# Patient Record
Sex: FEMALE | Race: BLACK OR AFRICAN AMERICAN | NOT HISPANIC OR LATINO | URBAN - METROPOLITAN AREA
[De-identification: names, ages, dates, MRNs, and addresses within clinical notes are randomized per-mention and may not be internally consistent; named-entity substitution may affect disease eponyms.]

---

## 2020-08-17 ENCOUNTER — OUT OF OFFICE VISIT (OUTPATIENT)
Dept: URBAN - METROPOLITAN AREA MEDICAL CENTER 16 | Facility: MEDICAL CENTER | Age: 60
End: 2020-08-17
Payer: SELF-PAY

## 2020-08-17 DIAGNOSIS — N17.8 OTHER ACUTE KIDNEY FAILURE: ICD-10-CM

## 2020-08-17 DIAGNOSIS — K50.118 CROHN'S COLITIS, OTHER COMPLICATION: ICD-10-CM

## 2020-08-17 DIAGNOSIS — Z91.19 NONCOMPLIANCE: ICD-10-CM

## 2020-08-17 DIAGNOSIS — R19.7 ACUTE DIARRHEA: ICD-10-CM

## 2020-08-17 PROCEDURE — 99232 SBSQ HOSP IP/OBS MODERATE 35: CPT | Performed by: INTERNAL MEDICINE

## 2020-08-17 PROCEDURE — 99254 IP/OBS CNSLTJ NEW/EST MOD 60: CPT | Performed by: INTERNAL MEDICINE

## 2021-01-25 ENCOUNTER — TELEPHONE ENCOUNTER (OUTPATIENT)
Dept: URBAN - METROPOLITAN AREA SURGERY CENTER 30 | Facility: SURGERY CENTER | Age: 61
End: 2021-01-25

## 2021-01-29 ENCOUNTER — TELEPHONE ENCOUNTER (OUTPATIENT)
Dept: URBAN - METROPOLITAN AREA CLINIC 118 | Facility: CLINIC | Age: 61
End: 2021-01-29

## 2021-01-29 ENCOUNTER — TELEPHONE ENCOUNTER (OUTPATIENT)
Dept: URBAN - METROPOLITAN AREA CLINIC 98 | Facility: CLINIC | Age: 61
End: 2021-01-29

## 2021-01-30 ENCOUNTER — TELEPHONE ENCOUNTER (OUTPATIENT)
Dept: URBAN - METROPOLITAN AREA CLINIC 98 | Facility: CLINIC | Age: 61
End: 2021-01-30

## 2021-02-05 ENCOUNTER — TELEPHONE ENCOUNTER (OUTPATIENT)
Dept: URBAN - METROPOLITAN AREA CLINIC 98 | Facility: CLINIC | Age: 61
End: 2021-02-05

## 2021-02-12 ENCOUNTER — TELEPHONE ENCOUNTER (OUTPATIENT)
Dept: URBAN - METROPOLITAN AREA CLINIC 98 | Facility: CLINIC | Age: 61
End: 2021-02-12

## 2021-02-22 ENCOUNTER — OUT OF OFFICE VISIT (OUTPATIENT)
Dept: URBAN - METROPOLITAN AREA MEDICAL CENTER 39 | Facility: MEDICAL CENTER | Age: 61
End: 2021-02-22
Payer: SELF-PAY

## 2021-02-22 DIAGNOSIS — R93.3 ABN FINDINGS-GI TRACT: ICD-10-CM

## 2021-02-22 DIAGNOSIS — K50.818 CROHN'S DISEASE OF BOTH SMALL AND LARGE INTESTINE WITH OTHER COMPLICATION: ICD-10-CM

## 2021-02-22 DIAGNOSIS — R19.7 ACUTE DIARRHEA: ICD-10-CM

## 2021-02-22 DIAGNOSIS — R10.84 ABDOMINAL CRAMPING, GENERALIZED: ICD-10-CM

## 2021-02-22 PROCEDURE — 99232 SBSQ HOSP IP/OBS MODERATE 35: CPT | Performed by: INTERNAL MEDICINE

## 2021-02-22 PROCEDURE — 99254 IP/OBS CNSLTJ NEW/EST MOD 60: CPT | Performed by: INTERNAL MEDICINE

## 2021-02-27 ENCOUNTER — OUT OF OFFICE VISIT (OUTPATIENT)
Dept: URBAN - METROPOLITAN AREA MEDICAL CENTER 39 | Facility: MEDICAL CENTER | Age: 61
End: 2021-02-27
Payer: SELF-PAY

## 2021-02-27 DIAGNOSIS — K92.2 ACUTE GASTROINTESTINAL BLEEDING: ICD-10-CM

## 2021-02-27 DIAGNOSIS — K52.89 (LYMPHOCYTIC) MICROSCOPIC COLITIS: ICD-10-CM

## 2021-02-27 DIAGNOSIS — K86.89 ATROPHIC PANCREAS: ICD-10-CM

## 2021-02-27 DIAGNOSIS — K29.60 ADENOPAPILLOMATOSIS GASTRICA: ICD-10-CM

## 2021-02-27 DIAGNOSIS — K50.80 CROHN'S COLITIS: ICD-10-CM

## 2021-02-27 DIAGNOSIS — K22.2 ACQUIRED ESOPHAGEAL RING: ICD-10-CM

## 2021-02-27 DIAGNOSIS — K62.5 ANAL BLEEDING: ICD-10-CM

## 2021-02-27 DIAGNOSIS — K20.80 ESOPHAGITIS, LOS ANGELES GRADE B: ICD-10-CM

## 2021-02-27 DIAGNOSIS — K63.5 BENIGN COLON POLYP: ICD-10-CM

## 2021-02-27 DIAGNOSIS — D12.3 ADENOMA OF TRANSVERSE COLON: ICD-10-CM

## 2021-02-27 PROCEDURE — 45380 COLONOSCOPY AND BIOPSY: CPT | Performed by: INTERNAL MEDICINE

## 2021-02-27 PROCEDURE — 43239 EGD BIOPSY SINGLE/MULTIPLE: CPT | Performed by: INTERNAL MEDICINE

## 2021-02-27 PROCEDURE — 45385 COLONOSCOPY W/LESION REMOVAL: CPT | Performed by: INTERNAL MEDICINE

## 2021-02-27 PROCEDURE — 99233 SBSQ HOSP IP/OBS HIGH 50: CPT | Performed by: INTERNAL MEDICINE

## 2021-02-27 PROCEDURE — 43249 ESOPH EGD DILATION <30 MM: CPT | Performed by: INTERNAL MEDICINE

## 2021-02-27 PROCEDURE — 99232 SBSQ HOSP IP/OBS MODERATE 35: CPT | Performed by: INTERNAL MEDICINE

## 2021-03-04 ENCOUNTER — OFFICE VISIT (OUTPATIENT)
Dept: URBAN - METROPOLITAN AREA CLINIC 118 | Facility: CLINIC | Age: 61
End: 2021-03-04

## 2021-03-04 RX ORDER — ONDANSETRON HYDROCHLORIDE 4 MG/1
TAKE 2 TABLETS (8 MG) BY ORAL ROUTE DAILY TABLET, FILM COATED ORAL
Qty: 60 | Refills: 3 | COMMUNITY
Start: 1900-01-01

## 2021-03-04 RX ORDER — PREDNISONE 5 MG/1
TAKE 1 TABLET (5 MG) BY ORAL ROUTE 3 TIMES PER DAY TABLET ORAL
Qty: 45 | Refills: 0 | COMMUNITY
Start: 1900-01-01

## 2021-03-04 RX ORDER — ESCITALOPRAM OXALATE 5 MG
TAKE 1 TABLET (5 MG) BY ORAL ROUTE ONCE DAILY TABLET ORAL 1
Qty: 0 | Refills: 0 | COMMUNITY
Start: 1900-01-01

## 2021-03-04 RX ORDER — LISINOPRIL 5 MG/1
TAKE 1 TABLET (5 MG) BY ORAL ROUTE ONCE DAILY TABLET ORAL 1
Qty: 0 | Refills: 0 | COMMUNITY
Start: 1900-01-01

## 2021-03-22 ENCOUNTER — TELEPHONE ENCOUNTER (OUTPATIENT)
Dept: URBAN - METROPOLITAN AREA CLINIC 98 | Facility: CLINIC | Age: 61
End: 2021-03-22

## 2021-03-22 RX ORDER — PREDNISONE 20 MG/1
1 TABLET TABLET ORAL ONCE A DAY
Qty: 90 TABLET | Refills: 3 | OUTPATIENT
Start: 2021-03-22 | End: 2022-03-17

## 2021-06-01 ENCOUNTER — OFFICE VISIT (OUTPATIENT)
Dept: URBAN - METROPOLITAN AREA CLINIC 98 | Facility: CLINIC | Age: 61
End: 2021-06-01
Payer: SELF-PAY

## 2021-06-01 DIAGNOSIS — K50.90 CROHN'S DISEASE: ICD-10-CM

## 2021-06-01 PROCEDURE — 99213 OFFICE O/P EST LOW 20 MIN: CPT | Performed by: INTERNAL MEDICINE

## 2021-06-01 RX ORDER — PREDNISONE 20 MG/1
1 TABLET TABLET ORAL ONCE A DAY
Qty: 90 TABLET | Refills: 3 | Status: ACTIVE | COMMUNITY
Start: 2021-03-22 | End: 2022-03-17

## 2021-06-01 RX ORDER — PREDNISONE 5 MG/1
TAKE 1 TABLET (5 MG) BY ORAL ROUTE 3 TIMES PER DAY TABLET ORAL
Qty: 45 | Refills: 0 | Status: ACTIVE | COMMUNITY
Start: 1900-01-01

## 2021-06-01 RX ORDER — ESCITALOPRAM OXALATE 5 MG
TAKE 1 TABLET (5 MG) BY ORAL ROUTE ONCE DAILY TABLET ORAL 1
Qty: 0 | Refills: 0 | Status: ACTIVE | COMMUNITY
Start: 1900-01-01

## 2021-06-01 RX ORDER — ONDANSETRON HYDROCHLORIDE 4 MG/1
TAKE 2 TABLETS (8 MG) BY ORAL ROUTE DAILY TABLET, FILM COATED ORAL
Qty: 60 | Refills: 3 | Status: ACTIVE | COMMUNITY
Start: 1900-01-01

## 2021-06-01 RX ORDER — LISINOPRIL 5 MG/1
TAKE 1 TABLET (5 MG) BY ORAL ROUTE ONCE DAILY TABLET ORAL 1
Qty: 0 | Refills: 0 | Status: ACTIVE | COMMUNITY
Start: 1900-01-01

## 2021-06-01 NOTE — HPI-TODAY'S VISIT:
She has gotten Humira and a nurse was going to help her get the injection.  Jennifer sent alot of pens  Refrigerated. TB test negative.  Current syx: Diarrhea, not that much.  Want to know about the safety. Lots of side effects.  Currently on 15 to 20 mg prednisone Humira as a single agent is safe  Takes 3 BP meds ---- in part due to prednisone  Had to break away and   Humira is better for her than prednison  Has insurance.  [place]. =================== 5/23/19  Follow-up Crohn's Disease    History Of Present Illness  This is a scheduled appointment for this patient, a 58 year old /Black female, after a previous visit approximately Crohn's disease.     59 yo female with moderate to severe Crohn's disease comes in for 1 year f/u.  Currently on 10 mg pred a day.  In past year, has been up to 20 mg a day.  Offered to get Humira for her but needed pre-tx labs and she did not get them done.  Advised going to the health department.  AGWS.  Typically has 1-2 stools a day. No blood in stool. Sometime has abdominal pain.   ============= 56 yo female with Crohn's disease dx 1998, CD visits with me since 2004 or earlier.  comes in with daughter Júnior Lopez  Past poor response to 6 MP and has used chronic prednisone.  Discussed Humira.  Now we think Stelara might be the best shot.  Currently on prednisone 15 mg a day.  On HCTz'ahd  1 other.  BP ok controll.  Not diabetic.  No other medical problems.  Chronic back problems, lifting daughter.  Works at [employer] $8.75/hr      Past Medical History  Disease Name Date Onset Notes  Crohn's disease unk --   Depression unk --   Hypertension unk --       Past Surgical History  Procedure Name Date Notes  *No Past Surgical History unk 05/23/2019 - 03/09/2018 -       Medication List  Name Date Started Instructions  Lexapro 5 mg oral tablet  take 1 tablet (5 mg) by oral route once daily  lisinopril 5 mg oral tablet  take 1 tablet (5 mg) by oral route once daily  multivitamin oral  --   prednisone 5 mg oral tablet 02/17/2019 TAKE 3 TABLETS BY MOUTH ONCE DAILY  Zofran 4 mg oral tablet  take 2 tablets (8 mg) by oral route daily      Allergy List  Allergen Name Date Reaction Notes  NO KNOWN DRUG ALLERGIES --  --  --   No Known Environmental Allergies --  --  --   No Known Food Allergies --  --  --       Family Medical History  Disease Name Relative/Age Notes  *No Stated Family Medical History Father/ Mother/  no known history      Social History  Finding Status Start/Stop Quantity Notes  Alcohol Never --/-- --  05/23/2019 - 03/09/2018 -   Denies illicit substance abuse --  --/-- --  05/23/2019 -   Denies substance abuse --  --/-- --  05/23/2019 -   Tobacco Former --/-- --  05/23/2019 - 03/09/2018 - 11/03/2016 - 09/04/2015 -       Review of Systems  ConstitutionalDenies : body aches, chills, fatigue, fever, loss of appetite, malaise, night sweats, weight gain, weight loss  EyesDenies : blurred vision, visual changes  HENTDenies : Ear Pain/Ringing, hearing loss, Mouth Ulcers/Sores, nose bleeds, problems with gums/teeth, trouble swallowing  CardiovascularDenies : chest pain, leaky heart valves, heart murmur, heart racing/skipping, high blood pressure, palpitations  RespiratoryDenies : chronic cough, shortness of breath, wheezing or asthma symptoms  GastrointestinalDenies : Abdominal Pain/discomfort, Anal/Rectal Pain or Itching, Anal Spasm, Black Stool, Bloating/belching/gaseouness, Change of Bowel Habit, Constipation, Diarrhea/Loose Stool, Difficulty in Swallowing, Heartburn/esophageal reflux, Hemorrhoids, Indigestion, Mucus in Stool, Nausea/vomiting, Rectal Bleeding, Unintentional Weight Loss  GenitourinaryDenies : Blood in Urine, Burning/pain with Urination, frequency, Recent/frequent UTI, Kidney Stones  IntegumentDenies : itching/dry skin, jaundice, rashes, bumps or sores  NeurologicDenies : headaches, dizziness/vertigo, head trauma/injury, recent numbness/weakness, seizures  MusculoskeletalDenies : back pain, decreased range of motion, joint pain/arthritis, Problems Walking/calf or leg pain  EndocrineDenies : bruise easily, excessive thirst, heat/cold intolerance, history of high or low blood sugar  PsychiatricDenies : anxiety, changes in sleep pattern, depression, loss of memory  Heme-LymphDenies : Bleeding Problems, Enlarged Nodes/Swolen Glands, excessive bruising, history of anemia  Allergic-ImmunologicDenies : seasonal allergies    Vitals  Date Time BP Position Site L\R Cuff Size HR RR TEMP (F) WT  HT  BMI kg/m2 BSA m2 O2 Sat HC     05/23/2019 02:25 /104 Sitting    88 - R  97.9 135lbs 0oz 5'  3" 23.91 1.65        Physical Examination  ConstitutionalAppearance : Well nourished, well developed patient in no distress. Ambulating without difficulty.  Ability to Communicate : Normal communication ability  EyesConjunctivae and Eyelids : Conjunctivae and eyelids appear normal  Sclerae : White without injection  HENTHead :  Inspection : Normocephalic and atraumatic  Face :  Inspection : Face within normal limits  Ears :  External Ears : External ears within normal limits  Nose/Nasopharynx :  External Nose : External nose normal appearance, nares patent, no nasal discharge  Mouth and Throat :  General : Oral cavity appearance normal, breath odor normal  Lips : Appearance normal  NeckInspection and Palpation : Normal appearance without tenderness on palpation or deformities, trachea midline  Thyroid : Normal size without tenderness, nodules or masses  Jugular Veins : no JVD  ChestInspection of Chest : No lesions, deformities or traumatic injuries present. No significant scars are present.  Respiratory Effort : Breathing is unlabored without accessory muscle use  Palpation of Chest : Chest wall non-tender with no masses present. Tactile fremitus is normal  Auscultation : Normal breath sounds  CardiovascularHeart :  Auscultation : Heart rate is regular with normal rhythm. No murmurs are heard. No edema.  GastrointestinalAbdominal Exam : Abdomen soft without rigidity or guarding, abdomen non-tender to palpation, normal bowel sounds, no masses present, non-distended  Liver and Spleen : No hepatomegaly present. Liver is non-tender to palpation and spleen is not palpable.  LymphaticNeck : No lymphadenopathy present  Axillae : No lymphadenopathy present  Groin : No lymphadenopathy present  MusculoskeletalGait and Station : Normal Gait, normal station  Neck/Spine/Pelvis : No tenderness of deformities present.  SkinGeneral Inspection : No rashes, lesions or areas of discoloration present. Skin turgor is normal.  General Palpation : No abnormalities, masses or tenderness on palpation.  Neurologic and PsychiatricOrientation : Oriented to person, place and time  Sensation : Normal sensation  Memory : Short and long term memory intact.  Mood and Affect : Normal mood with an appropriate affect      Assessment  Crohn's Disease     555.2    Plan  OrdersPatient not identified as an unhealthy alcohol user when screene () - - 05/23/2019  Influenza immunization administered or previously received () - - 05/23/2019  BMI screening above normal () - - 05/23/2019  Current tobacco non-user (CAD, cap, COPD, PV) (dm) (IBD) (1036F, ) - - 05/23/2019  Colorectal cancer screening results documented and reviewed (PV) (3017F) - - 05/23/2019  Documentation of current medications. () - - 05/23/2019  InstructionsI have documented a list of current medications and reviewed it with the patient.  I encouraged the patient to diet and exercise.  CorrespondenceCC this document (Wayne Geiger Jr., MD) - 5/23/2019    Same labs as I ordered in 2018. She has started Lexapro, at  5mg, and is being increased to 10 mg a day.   has heart disease and is diabetic.  He has 3 stents.  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                                                                 Electronically Signed by: Curry Ludwig MD -Author on May 23, 2019 03:21:42 PM

## 2021-06-28 ENCOUNTER — OFFICE VISIT (OUTPATIENT)
Dept: URBAN - METROPOLITAN AREA CLINIC 98 | Facility: CLINIC | Age: 61
End: 2021-06-28
Payer: SELF-PAY

## 2021-06-28 DIAGNOSIS — K50.80 CROHN'S COLITIS: ICD-10-CM

## 2021-06-28 PROCEDURE — 99215 OFFICE O/P EST HI 40 MIN: CPT | Performed by: INTERNAL MEDICINE

## 2021-06-28 RX ORDER — ESCITALOPRAM OXALATE 5 MG
TAKE 1 TABLET (5 MG) BY ORAL ROUTE ONCE DAILY TABLET ORAL 1
Qty: 0 | Refills: 0 | Status: ACTIVE | COMMUNITY
Start: 1900-01-01

## 2021-06-28 RX ORDER — PREDNISONE 5 MG/1
TAKE 1 TABLET (5 MG) BY ORAL ROUTE 3 TIMES PER DAY TABLET ORAL
Qty: 45 | Refills: 0 | Status: ACTIVE | COMMUNITY
Start: 1900-01-01

## 2021-06-28 RX ORDER — ONDANSETRON HYDROCHLORIDE 4 MG/1
TAKE 2 TABLETS (8 MG) BY ORAL ROUTE DAILY TABLET, FILM COATED ORAL
Qty: 60 | Refills: 3 | Status: ACTIVE | COMMUNITY
Start: 1900-01-01

## 2021-06-28 RX ORDER — PREDNISONE 20 MG/1
1 TABLET TABLET ORAL ONCE A DAY
Qty: 90 TABLET | Refills: 3 | Status: ACTIVE | COMMUNITY
Start: 2021-03-22 | End: 2022-03-17

## 2021-06-28 RX ORDER — LISINOPRIL 5 MG/1
TAKE 1 TABLET (5 MG) BY ORAL ROUTE ONCE DAILY TABLET ORAL 1
Qty: 0 | Refills: 0 | Status: ACTIVE | COMMUNITY
Start: 1900-01-01

## 2021-06-28 NOTE — HPI-TODAY'S VISIT:
61 yo female comes in for f/u to assess and discuss treatment options.  I have tried for years to get her on an alternative medication to prednisone.  Finally she accepted Humira but now has second thoughts.  She is concerned about its safety profile.  I said that side effects do occur, but most people tolerate it wel.  Hopefully it will work for her.  Hopefully she will have no or well tolerated side effects. Has been on prednisone for many years. 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 She has depression and is advised to go to a [facility] for eval and guidance. Maybe if the pred is tapered and hopefully but not certainly d/c, she will be better overall.  On prednisone 20 mg a day.  No abd pain. No diarrhea. No joint aches.  Encouraged her to learn it hersellf Offered to talk with her Humira ambassador but she said they don't do that.  ================================ 6/3/21  She has gotten Humira and a nurse was going to help her get the injection.  Jennifer sent alot of pens  Refrigerated. TB test negative.  Current syx: Diarrhea, not that much.  Want to know about the safety. Lots of side effects.  Currently on 15 to 20 mg prednisone Humira as a single agent is safe  Takes 3 BP meds ---- in part due to prednisone  Had to break away and   Humira is better for her than prednison  Has insurance.  [place]. =================== 5/23/19  Follow-up Crohn's Disease    History Of Present Illness  This is a scheduled appointment for this patient, a 58 year old /Black female, after a previous visit approximately Crohn's disease.     59 yo female with moderate to severe Crohn's disease comes in for 1 year f/u.  Currently on 10 mg pred a day.  In past year, has been up to 20 mg a day.  Offered to get Humira for her but needed pre-tx labs and she did not get them done.  Advised going to the health department.  AGWS.  Typically has 1-2 stools a day. No blood in stool. Sometime has abdominal pain.   ============= 58 yo female with Crohn's disease dx 1998, CD visits with me since 2004 or earlier.  comes in with daughter Júnior Lopez  Past poor response to 6 MP and has used chronic prednisone.  Discussed Humira.  Now we think Stelara might be the best shot.  Currently on prednisone 15 mg a day.  On HCTz'ahd  1 other.  BP ok controll.  Not diabetic.  No other medical problems.  Chronic back problems, lifting daughter.  Works at [employer] $8.75/hr      Past Medical History  Disease Name Date Onset Notes  Crohn's disease unk --   Depression unk --   Hypertension unk --       Past Surgical History  Procedure Name Date Notes  *No Past Surgical History unk 05/23/2019 - 03/09/2018 -       Medication List  Name Date Started Instructions  Lexapro 5 mg oral tablet  take 1 tablet (5 mg) by oral route once daily  lisinopril 5 mg oral tablet  take 1 tablet (5 mg) by oral route once daily  multivitamin oral  --   prednisone 5 mg oral tablet 02/17/2019 TAKE 3 TABLETS BY MOUTH ONCE DAILY  Zofran 4 mg oral tablet  take 2 tablets (8 mg) by oral route daily      Allergy List  Allergen Name Date Reaction Notes  NO KNOWN DRUG ALLERGIES --  --  --   No Known Environmental Allergies --  --  --   No Known Food Allergies --  --  --       Family Medical History  Disease Name Relative/Age Notes  *No Stated Family Medical History Father/ Mother/  no known history      Social History  Finding Status Start/Stop Quantity Notes  Alcohol Never --/-- --  05/23/2019 - 03/09/2018 -   Denies illicit substance abuse --  --/-- --  05/23/2019 -   Denies substance abuse --  --/-- --  05/23/2019 -   Tobacco Former --/-- --  05/23/2019 - 03/09/2018 - 11/03/2016 - 09/04/2015 -       Review of Systems  ConstitutionalDenies : body aches, chills, fatigue, fever, loss of appetite, malaise, night sweats, weight gain, weight loss  EyesDenies : blurred vision, visual changes  HENTDenies : Ear Pain/Ringing, hearing loss, Mouth Ulcers/Sores, nose bleeds, problems with gums/teeth, trouble swallowing  CardiovascularDenies : chest pain, leaky heart valves, heart murmur, heart racing/skipping, high blood pressure, palpitations  RespiratoryDenies : chronic cough, shortness of breath, wheezing or asthma symptoms  GastrointestinalDenies : Abdominal Pain/discomfort, Anal/Rectal Pain or Itching, Anal Spasm, Black Stool, Bloating/belching/gaseouness, Change of Bowel Habit, Constipation, Diarrhea/Loose Stool, Difficulty in Swallowing, Heartburn/esophageal reflux, Hemorrhoids, Indigestion, Mucus in Stool, Nausea/vomiting, Rectal Bleeding, Unintentional Weight Loss  GenitourinaryDenies : Blood in Urine, Burning/pain with Urination, frequency, Recent/frequent UTI, Kidney Stones  IntegumentDenies : itching/dry skin, jaundice, rashes, bumps or sores  NeurologicDenies : headaches, dizziness/vertigo, head trauma/injury, recent numbness/weakness, seizures  MusculoskeletalDenies : back pain, decreased range of motion, joint pain/arthritis, Problems Walking/calf or leg pain  EndocrineDenies : bruise easily, excessive thirst, heat/cold intolerance, history of high or low blood sugar  PsychiatricDenies : anxiety, changes in sleep pattern, depression, loss of memory  Heme-LymphDenies : Bleeding Problems, Enlarged Nodes/Swolen Glands, excessive bruising, history of anemia  Allergic-ImmunologicDenies : seasonal allergies    Vitals  Date Time BP Position Site L\R Cuff Size HR RR TEMP (F) WT  HT  BMI kg/m2 BSA m2 O2 Sat      05/23/2019 02:25 /104 Sitting    88 - R  97.9 135lbs 0oz 5'  3" 23.91 1.65        Physical Examination  ConstitutionalAppearance : Well nourished, well developed patient in no distress. Ambulating without difficulty.  Ability to Communicate : Normal communication ability  EyesConjunctivae and Eyelids : Conjunctivae and eyelids appear normal  Sclerae : White without injection  HENTHead :  Inspection : Normocephalic and atraumatic  Face :  Inspection : Face within normal limits  Ears :  External Ears : External ears within normal limits  Nose/Nasopharynx :  External Nose : External nose normal appearance, nares patent, no nasal discharge  Mouth and Throat :  General : Oral cavity appearance normal, breath odor normal  Lips : Appearance normal  NeckInspection and Palpation : Normal appearance without tenderness on palpation or deformities, trachea midline  Thyroid : Normal size without tenderness, nodules or masses  Jugular Veins : no JVD  ChestInspection of Chest : No lesions, deformities or traumatic injuries present. No significant scars are present.  Respiratory Effort : Breathing is unlabored without accessory muscle use  Palpation of Chest : Chest wall non-tender with no masses present. Tactile fremitus is normal  Auscultation : Normal breath sounds  CardiovascularHeart :  Auscultation : Heart rate is regular with normal rhythm. No murmurs are heard. No edema.  GastrointestinalAbdominal Exam : Abdomen soft without rigidity or guarding, abdomen non-tender to palpation, normal bowel sounds, no masses present, non-distended  Liver and Spleen : No hepatomegaly present. Liver is non-tender to palpation and spleen is not palpable.  LymphaticNeck : No lymphadenopathy present  Axillae : No lymphadenopathy present  Groin : No lymphadenopathy present  MusculoskeletalGait and Station : Normal Gait, normal station  Neck/Spine/Pelvis : No tenderness of deformities present.  SkinGeneral Inspection : No rashes, lesions or areas of discoloration present. Skin turgor is normal.  General Palpation : No abnormalities, masses or tenderness on palpation.  Neurologic and PsychiatricOrientation : Oriented to person, place and time  Sensation : Normal sensation  Memory : Short and long term memory intact.  Mood and Affect : Normal mood with an appropriate affect      Assessment  Crohn's Disease     555.2    Plan  OrdersPatient not identified as an unhealthy alcohol user when screene () - - 05/23/2019  Influenza immunization administered or previously received () - - 05/23/2019  BMI screening above normal () - - 05/23/2019  Current tobacco non-user (CAD, cap, COPD, PV) (dm) (IBD) (1036F, ) - - 05/23/2019  Colorectal cancer screening results documented and reviewed (PV) (3017F) - - 05/23/2019  Documentation of current medications. () - - 05/23/2019  InstructionsI have documented a list of current medications and reviewed it with the patient.  I encouraged the patient to diet and exercise.  CorrespondenceCC this document (Wayne Geiger Jr., MD) - 5/23/2019    Same labs as I ordered in 2018. She has started Lexapro, at  5mg, and is being increased to 10 mg a day.   has heart disease and is diabetic.  He has 3 stents.  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                                                                 Electronically Signed by: Curry Ludwig MD -Author on May 23, 2019 03:21:42 PM

## 2021-12-13 ENCOUNTER — OFFICE VISIT (OUTPATIENT)
Dept: URBAN - METROPOLITAN AREA CLINIC 98 | Facility: CLINIC | Age: 61
End: 2021-12-13

## 2021-12-13 ENCOUNTER — TELEPHONE ENCOUNTER (OUTPATIENT)
Dept: URBAN - METROPOLITAN AREA CLINIC 92 | Facility: CLINIC | Age: 61
End: 2021-12-13

## 2021-12-13 RX ORDER — PREDNISONE 5 MG/1
TAKE 1 TABLET (5 MG) BY ORAL ROUTE 3 TIMES PER DAY TABLET ORAL
Qty: 45 | Refills: 0 | Status: ACTIVE | COMMUNITY
Start: 1900-01-01

## 2021-12-13 RX ORDER — ONDANSETRON HYDROCHLORIDE 4 MG/1
TAKE 2 TABLETS (8 MG) BY ORAL ROUTE DAILY TABLET, FILM COATED ORAL
Qty: 60 | Refills: 3 | Status: ACTIVE | COMMUNITY
Start: 1900-01-01

## 2021-12-13 RX ORDER — LISINOPRIL 5 MG/1
TAKE 1 TABLET (5 MG) BY ORAL ROUTE ONCE DAILY TABLET ORAL 1
Qty: 0 | Refills: 0 | Status: ACTIVE | COMMUNITY
Start: 1900-01-01

## 2021-12-13 RX ORDER — ERYTHROMYCIN 20 MG/ML
1 APPLICATION SOLUTION TOPICAL TWICE A DAY
Status: ACTIVE | COMMUNITY

## 2021-12-13 RX ORDER — ESCITALOPRAM OXALATE 5 MG
TAKE 1 TABLET (5 MG) BY ORAL ROUTE ONCE DAILY TABLET ORAL 1
Qty: 0 | Refills: 0 | Status: ACTIVE | COMMUNITY
Start: 1900-01-01

## 2021-12-13 RX ORDER — PREDNISONE 20 MG/1
1 TABLET TABLET ORAL ONCE A DAY
Qty: 90 TABLET | Refills: 3 | Status: ACTIVE | COMMUNITY
Start: 2021-03-22 | End: 2022-03-17

## 2021-12-13 RX ORDER — DOXYCYCLINE HYCLATE 100 MG/1
1 TABLET TABLET, COATED ORAL TWICE A DAY
Status: ACTIVE | COMMUNITY

## 2021-12-17 ENCOUNTER — OFFICE VISIT (OUTPATIENT)
Dept: URBAN - METROPOLITAN AREA CLINIC 98 | Facility: CLINIC | Age: 61
End: 2021-12-17

## 2021-12-17 RX ORDER — ESCITALOPRAM OXALATE 5 MG
TAKE 1 TABLET (5 MG) BY ORAL ROUTE ONCE DAILY TABLET ORAL 1
Qty: 0 | Refills: 0 | Status: ACTIVE | COMMUNITY
Start: 1900-01-01

## 2021-12-17 RX ORDER — ONDANSETRON HYDROCHLORIDE 4 MG/1
TAKE 2 TABLETS (8 MG) BY ORAL ROUTE DAILY TABLET, FILM COATED ORAL
Qty: 60 | Refills: 3 | Status: ACTIVE | COMMUNITY
Start: 1900-01-01

## 2021-12-17 RX ORDER — PREDNISONE 5 MG/1
TAKE 1 TABLET (5 MG) BY ORAL ROUTE 3 TIMES PER DAY TABLET ORAL
Qty: 45 | Refills: 0 | Status: ACTIVE | COMMUNITY
Start: 1900-01-01

## 2021-12-17 RX ORDER — PREDNISONE 20 MG/1
1 TABLET TABLET ORAL ONCE A DAY
Qty: 90 TABLET | Refills: 3 | Status: ACTIVE | COMMUNITY
Start: 2021-03-22 | End: 2022-03-17

## 2021-12-17 RX ORDER — ERYTHROMYCIN 20 MG/ML
1 APPLICATION SOLUTION TOPICAL TWICE A DAY
Status: ACTIVE | COMMUNITY

## 2021-12-17 RX ORDER — DOXYCYCLINE HYCLATE 100 MG/1
1 TABLET TABLET, COATED ORAL TWICE A DAY
Status: ACTIVE | COMMUNITY

## 2021-12-17 RX ORDER — LISINOPRIL 5 MG/1
TAKE 1 TABLET (5 MG) BY ORAL ROUTE ONCE DAILY TABLET ORAL 1
Qty: 0 | Refills: 0 | Status: ACTIVE | COMMUNITY
Start: 1900-01-01

## 2021-12-20 ENCOUNTER — OFFICE VISIT (OUTPATIENT)
Dept: URBAN - METROPOLITAN AREA CLINIC 98 | Facility: CLINIC | Age: 61
End: 2021-12-20
Payer: SELF-PAY

## 2021-12-20 DIAGNOSIS — K50.90 CROHN'S DISEASE: ICD-10-CM

## 2021-12-20 PROCEDURE — 99214 OFFICE O/P EST MOD 30 MIN: CPT | Performed by: INTERNAL MEDICINE

## 2021-12-20 RX ORDER — ONDANSETRON HYDROCHLORIDE 4 MG/1
TAKE 2 TABLETS (8 MG) BY ORAL ROUTE DAILY TABLET, FILM COATED ORAL
Qty: 60 | Refills: 3 | Status: ACTIVE | COMMUNITY
Start: 1900-01-01

## 2021-12-20 RX ORDER — ADALIMUMAB 80MG/0.8ML
INJECT 160 MG DAY 1, THEN 80 MG DAY 15 KIT SUBCUTANEOUS
Status: ACTIVE | COMMUNITY

## 2021-12-20 RX ORDER — ERYTHROMYCIN 20 MG/ML
1 APPLICATION SOLUTION TOPICAL TWICE A DAY
Status: ACTIVE | COMMUNITY

## 2021-12-20 RX ORDER — PREDNISONE 5 MG/1
TAKE 1 TABLET (5 MG) BY ORAL ROUTE 3 TIMES PER DAY TABLET ORAL
Qty: 45 | Refills: 0 | Status: ACTIVE | COMMUNITY
Start: 1900-01-01

## 2021-12-20 RX ORDER — PREDNISONE 20 MG/1
1 TABLET TABLET ORAL ONCE A DAY
Qty: 90 TABLET | Refills: 3 | Status: ACTIVE | COMMUNITY
Start: 2021-03-22 | End: 2022-03-17

## 2021-12-20 RX ORDER — LISINOPRIL 5 MG/1
TAKE 1 TABLET (5 MG) BY ORAL ROUTE ONCE DAILY TABLET ORAL 1
Qty: 0 | Refills: 0 | Status: ACTIVE | COMMUNITY
Start: 1900-01-01

## 2021-12-20 RX ORDER — DOXYCYCLINE HYCLATE 100 MG/1
1 TABLET TABLET, COATED ORAL TWICE A DAY
Status: ACTIVE | COMMUNITY

## 2021-12-20 RX ORDER — ESCITALOPRAM OXALATE 5 MG
TAKE 1 TABLET (5 MG) BY ORAL ROUTE ONCE DAILY TABLET ORAL 1
Qty: 0 | Refills: 0 | Status: ACTIVE | COMMUNITY
Start: 1900-01-01

## 2021-12-20 NOTE — HPI-TODAY'S VISIT:
61 yo female got an infected stye. She asks about Entyvio. A friend of a friend did well with Entyvio.  No h/o TB. Non smoker. I do not recommend Entyvio over Humira for her.    =========================== 6/28/21  61 yo female comes in for f/u to assess and discuss treatment options.  I have tried for years to get her on an alternative medication to prednisone.  Finally she accepted Humira but now has second thoughts.  She is concerned about its safety profile.  I said that side effects do occur, but most people tolerate it wel.  Hopefully it will work for her.  Hopefully she will have no or well tolerated side effects. Has been on prednisone for many years. 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 She has depression and is advised to go to a [facility] mental health center for eval and guidance. Maybe if the pred is tapered and hopefully but not certainly d/c, she will be better overall.  On prednisone 20 mg a day.  No abd pain. No diarrhea. No joint aches.  Encouraged her to learn it hersellf Offered to talk with her Humira ambassador but she said they don't do that.  ================================ 6/3/21  She has gotten Humira and a nurse was going to help her get the injection.  Jennifer sent alot of pens  Refrigerated. TB test negative.  Current syx: Diarrhea, not that much.  Want to know about the safety. Lots of side effects.  Currently on 15 to 20 mg prednisone Humira as a single agent is safe  Takes 3 BP meds ---- in part due to prednisone  Had to break away and   Humira is better for her than prednison  Has insurance.  Tonyr. =================== 5/23/19  Follow-up Crohn's Disease    History Of Present Illness  This is a scheduled appointment for this patient, a 58 year old /Black female, after a previous visit approximately Crohn's disease.     59 yo female with moderate to severe Crohn's disease comes in for 1 year f/u.  Currently on 10 mg pred a day.  In past year, has been up to 20 mg a day.  Offered to get Humira for her but needed pre-tx labs and she did not get them done.  Advised going to the health department.  AGWS.  Typically has 1-2 stools a day. No blood in stool. Sometime has abdominal pain.   ============= 56 yo female with Crohn's disease dx 1998, CD visits with me since 2004 or earlier.  comes in with daughter Júnior Lopez  Past poor response to 6 MP and has used chronic prednisone.  Discussed Humira.  Now we think Stelara might be the best shot.  Currently on prednisone 15 mg a day.  On HCTz'ahd  1 other.  BP ok controll.  Not diabetic.  No other medical problems.  Chronic back problems, lifting daughter.  Works at [employer] $8.75/hr      Past Medical History  Disease Name Date Onset Notes  Crohn's disease unk --   Depression unk --   Hypertension unk --       Past Surgical History  Procedure Name Date Notes  *No Past Surgical History unk 05/23/2019 - 03/09/2018 -       Medication List  Name Date Started Instructions  Lexapro 5 mg oral tablet  take 1 tablet (5 mg) by oral route once daily  lisinopril 5 mg oral tablet  take 1 tablet (5 mg) by oral route once daily  multivitamin oral  --   prednisone 5 mg oral tablet 02/17/2019 TAKE 3 TABLETS BY MOUTH ONCE DAILY  Zofran 4 mg oral tablet  take 2 tablets (8 mg) by oral route daily      Allergy List  Allergen Name Date Reaction Notes  NO KNOWN DRUG ALLERGIES --  --  --   No Known Environmental Allergies --  --  --   No Known Food Allergies --  --  --       Family Medical History  Disease Name Relative/Age Notes  *No Stated Family Medical History Father/ Mother/  no known history      Social History  Finding Status Start/Stop Quantity Notes  Alcohol Never --/-- --  05/23/2019 - 03/09/2018 -   Denies illicit substance abuse --  --/-- --  05/23/2019 -   Denies substance abuse --  --/-- --  05/23/2019 -   Tobacco Former --/-- --  05/23/2019 - 03/09/2018 - 11/03/2016 - 09/04/2015 -       Review of Systems  ConstitutionalDenies : body aches, chills, fatigue, fever, loss of appetite, malaise, night sweats, weight gain, weight loss  EyesDenies : blurred vision, visual changes  HENTDenies : Ear Pain/Ringing, hearing loss, Mouth Ulcers/Sores, nose bleeds, problems with gums/teeth, trouble swallowing  CardiovascularDenies : chest pain, leaky heart valves, heart murmur, heart racing/skipping, high blood pressure, palpitations  RespiratoryDenies : chronic cough, shortness of breath, wheezing or asthma symptoms  GastrointestinalDenies : Abdominal Pain/discomfort, Anal/Rectal Pain or Itching, Anal Spasm, Black Stool, Bloating/belching/gaseouness, Change of Bowel Habit, Constipation, Diarrhea/Loose Stool, Difficulty in Swallowing, Heartburn/esophageal reflux, Hemorrhoids, Indigestion, Mucus in Stool, Nausea/vomiting, Rectal Bleeding, Unintentional Weight Loss  GenitourinaryDenies : Blood in Urine, Burning/pain with Urination, frequency, Recent/frequent UTI, Kidney Stones  IntegumentDenies : itching/dry skin, jaundice, rashes, bumps or sores  NeurologicDenies : headaches, dizziness/vertigo, head trauma/injury, recent numbness/weakness, seizures  MusculoskeletalDenies : back pain, decreased range of motion, joint pain/arthritis, Problems Walking/calf or leg pain  EndocrineDenies : bruise easily, excessive thirst, heat/cold intolerance, history of high or low blood sugar  PsychiatricDenies : anxiety, changes in sleep pattern, depression, loss of memory  Heme-LymphDenies : Bleeding Problems, Enlarged Nodes/Swolen Glands, excessive bruising, history of anemia  Allergic-ImmunologicDenies : seasonal allergies    Vitals  Date Time BP Position Site L\R Cuff Size HR RR TEMP (F) WT  HT  BMI kg/m2 BSA m2 O2 Sat HC     05/23/2019 02:25 /104 Sitting    88 - R  97.9 135lbs 0oz 5'  3" 23.91 1.65        Physical Examination  ConstitutionalAppearance : Well nourished, well developed patient in no distress. Ambulating without difficulty.  Ability to Communicate : Normal communication ability  EyesConjunctivae and Eyelids : Conjunctivae and eyelids appear normal  Sclerae : White without injection  HENTHead :  Inspection : Normocephalic and atraumatic  Face :  Inspection : Face within normal limits  Ears :  External Ears : External ears within normal limits  Nose/Nasopharynx :  External Nose : External nose normal appearance, nares patent, no nasal discharge  Mouth and Throat :  General : Oral cavity appearance normal, breath odor normal  Lips : Appearance normal  NeckInspection and Palpation : Normal appearance without tenderness on palpation or deformities, trachea midline  Thyroid : Normal size without tenderness, nodules or masses  Jugular Veins : no JVD  ChestInspection of Chest : No lesions, deformities or traumatic injuries present. No significant scars are present.  Respiratory Effort : Breathing is unlabored without accessory muscle use  Palpation of Chest : Chest wall non-tender with no masses present. Tactile fremitus is normal  Auscultation : Normal breath sounds  CardiovascularHeart :  Auscultation : Heart rate is regular with normal rhythm. No murmurs are heard. No edema.  GastrointestinalAbdominal Exam : Abdomen soft without rigidity or guarding, abdomen non-tender to palpation, normal bowel sounds, no masses present, non-distended  Liver and Spleen : No hepatomegaly present. Liver is non-tender to palpation and spleen is not palpable.  LymphaticNeck : No lymphadenopathy present  Axillae : No lymphadenopathy present  Groin : No lymphadenopathy present  MusculoskeletalGait and Station : Normal Gait, normal station  Neck/Spine/Pelvis : No tenderness of deformities present.  SkinGeneral Inspection : No rashes, lesions or areas of discoloration present. Skin turgor is normal.  General Palpation : No abnormalities, masses or tenderness on palpation.  Neurologic and PsychiatricOrientation : Oriented to person, place and time  Sensation : Normal sensation  Memory : Short and long term memory intact.  Mood and Affect : Normal mood with an appropriate affect      Assessment  Crohn's Disease     555.2    Plan  OrdersPatient not identified as an unhealthy alcohol user when screene () - - 05/23/2019  Influenza immunization administered or previously received () - - 05/23/2019  BMI screening above normal () - - 05/23/2019  Current tobacco non-user (CAD, cap, COPD, PV) (dm) (IBD) (1036F, ) - - 05/23/2019  Colorectal cancer screening results documented and reviewed (PV) (3017F) - - 05/23/2019  Documentation of current medications. () - - 05/23/2019  InstructionsI have documented a list of current medications and reviewed it with the patient.  I encouraged the patient to diet and exercise.  CorrespondenceCC this document (Wayne Geiger Jr., MD) - 5/23/2019    Same labs as I ordered in 2018. She has started Lexapro, at  5mg, and is being increased to 10 mg a day.   has heart disease and is diabetic.  He has 3 stents.  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                                                                 Electronically Signed by: Curry Ludwig MD -Author on May 23, 2019 03:21:42 PM

## 2021-12-21 ENCOUNTER — TELEPHONE ENCOUNTER (OUTPATIENT)
Dept: URBAN - METROPOLITAN AREA CLINIC 98 | Facility: CLINIC | Age: 61
End: 2021-12-21

## 2021-12-22 ENCOUNTER — TELEPHONE ENCOUNTER (OUTPATIENT)
Dept: URBAN - METROPOLITAN AREA CLINIC 98 | Facility: CLINIC | Age: 61
End: 2021-12-22

## 2021-12-22 LAB
A/G RATIO: 1.1
ALBUMIN: 3.7
ALKALINE PHOSPHATASE: 77
ALT (SGPT): 5
AST (SGOT): 15
BASO (ABSOLUTE): 0.1
BASOS: 0
BILIRUBIN, TOTAL: 0.4
BUN/CREATININE RATIO: 25
BUN: 30
C-REACTIVE PROTEIN, QUANT: 34
CALCIUM: 10
CARBON DIOXIDE, TOTAL: 25
CHLORIDE: 100
CREATININE: 1.19
EGFR IF AFRICN AM: 58
EGFR IF NONAFRICN AM: 50
EOS (ABSOLUTE): 0.1
EOS: 0
FERRITIN, SERUM: 31
FOLATE (FOLIC ACID), SERUM: >20
GLOBULIN, TOTAL: 3.5
GLUCOSE: 83
HBSAG SCREEN: NEGATIVE
HEMATOCRIT: 32.5
HEMATOLOGY COMMENTS:: (no result)
HEMOGLOBIN: 10
HEP B SURFACE AB, QUAL: NON REACTIVE
IMMATURE CELLS: (no result)
IMMATURE GRANS (ABS): 0.1
IMMATURE GRANULOCYTES: 1
IRON BIND.CAP.(TIBC): 314
IRON SATURATION: 11
IRON: 36
LYMPHS (ABSOLUTE): 1
LYMPHS: 5
MCH: 25.3
MCHC: 30.8
MCV: 82
MONOCYTES(ABSOLUTE): 0.6
MONOCYTES: 3
NEUTROPHILS (ABSOLUTE): 18.5
NEUTROPHILS: 91
NRBC: (no result)
PLATELETS: 452
POTASSIUM: 3.7
PROTEIN, TOTAL: 7.2
QUANTIFERON CRITERIA: (no result)
QUANTIFERON INCUBATION: (no result)
QUANTIFERON MITOGEN VALUE: 0.63
QUANTIFERON NIL VALUE: 0
QUANTIFERON TB1 AG VALUE: 0
QUANTIFERON TB2 AG VALUE: 0
QUANTIFERON-TB GOLD PLUS: NEGATIVE
RBC: 3.96
RDW: 15
SEDIMENTATION RATE-WESTERGREN: 76
SODIUM: 141
UIBC: 278
VITAMIN B12: 542
VITAMIN D, 25-HYDROXY: 24.2
WBC: 20.4

## 2021-12-22 RX ORDER — ERGOCALCIFEROL 1.25 MG/1
1 CAPSULE CAPSULE, LIQUID FILLED ORAL
Qty: 12 CAPSULES | Refills: 1 | OUTPATIENT
Start: 2021-12-22 | End: 2022-06-20

## 2022-01-04 ENCOUNTER — TELEPHONE ENCOUNTER (OUTPATIENT)
Dept: URBAN - METROPOLITAN AREA CLINIC 98 | Facility: CLINIC | Age: 62
End: 2022-01-04

## 2022-01-04 RX ORDER — ERGOCALCIFEROL 1.25 MG/1
1 CAPSULE CAPSULE, LIQUID FILLED ORAL
Qty: 12 CAPSULES | Refills: 1 | OUTPATIENT
Start: 2022-01-04 | End: 2022-07-03

## 2022-01-10 ENCOUNTER — TELEPHONE ENCOUNTER (OUTPATIENT)
Dept: URBAN - METROPOLITAN AREA CLINIC 98 | Facility: CLINIC | Age: 62
End: 2022-01-10

## 2022-01-10 RX ORDER — ADALIMUMAB 80MG/0.8ML
INJECT 160 MG DAY 1, THEN 80 MG DAY 15 KIT SUBCUTANEOUS
Qty: 1 KIT | Refills: 0 | OUTPATIENT
Start: 2022-01-13 | End: 2022-01-28

## 2022-01-10 RX ORDER — ADALIMUMAB 40MG/0.4ML
INJECT 40 MG KIT SUBCUTANEOUS EVERY OTHER WEEK
Qty: 6 PEN NEEDLE | Refills: 3 | OUTPATIENT
Start: 2022-01-13 | End: 2023-01-08

## 2022-02-22 ENCOUNTER — OFFICE VISIT (OUTPATIENT)
Dept: URBAN - METROPOLITAN AREA CLINIC 98 | Facility: CLINIC | Age: 62
End: 2022-02-22
Payer: SELF-PAY

## 2022-02-22 ENCOUNTER — TELEPHONE ENCOUNTER (OUTPATIENT)
Dept: URBAN - METROPOLITAN AREA CLINIC 98 | Facility: CLINIC | Age: 62
End: 2022-02-22

## 2022-02-22 DIAGNOSIS — H57.89 EYE REDNESS: ICD-10-CM

## 2022-02-22 DIAGNOSIS — H05.20 ACQUIRED EXOPHTHALMOS: ICD-10-CM

## 2022-02-22 DIAGNOSIS — K50.90 CROHN'S DISEASE: ICD-10-CM

## 2022-02-22 PROCEDURE — 99215 OFFICE O/P EST HI 40 MIN: CPT | Performed by: INTERNAL MEDICINE

## 2022-02-22 RX ORDER — ADALIMUMAB 40MG/0.4ML
0.4 ML KIT SUBCUTANEOUS EVERY OTHER WEEK
Qty: 2 PEN NEEDLE | Refills: 11 | OUTPATIENT
Start: 2022-02-22 | End: 2023-02-17

## 2022-02-22 RX ORDER — ERGOCALCIFEROL 1.25 MG/1
1 CAPSULE CAPSULE, LIQUID FILLED ORAL
Qty: 12 CAPSULES | Refills: 1 | Status: ACTIVE | COMMUNITY
Start: 2021-12-22 | End: 2022-06-20

## 2022-02-22 RX ORDER — ESCITALOPRAM OXALATE 5 MG
TAKE 1 TABLET (5 MG) BY ORAL ROUTE ONCE DAILY TABLET ORAL 1
Qty: 0 | Refills: 0 | Status: ACTIVE | COMMUNITY
Start: 1900-01-01

## 2022-02-22 RX ORDER — PREDNISONE 20 MG/1
1 TABLET TABLET ORAL ONCE A DAY
Qty: 90 TABLET | Refills: 3 | Status: ACTIVE | COMMUNITY
Start: 2021-03-22 | End: 2022-03-17

## 2022-02-22 RX ORDER — ERGOCALCIFEROL 1.25 MG/1
1 CAPSULE CAPSULE, LIQUID FILLED ORAL
Qty: 12 CAPSULES | Refills: 1 | Status: ACTIVE | COMMUNITY
Start: 2022-01-04 | End: 2022-07-03

## 2022-02-22 RX ORDER — ADALIMUMAB 80MG/0.8ML
INJECT 160 MG DAY 1, THEN 80 MG DAY 15 KIT SUBCUTANEOUS
Qty: 1 KIT | Refills: 0 | OUTPATIENT
Start: 2022-02-22 | End: 2022-03-09

## 2022-02-22 RX ORDER — ONDANSETRON HYDROCHLORIDE 4 MG/1
TAKE 2 TABLETS (8 MG) BY ORAL ROUTE DAILY TABLET, FILM COATED ORAL
Qty: 60 | Refills: 3 | Status: ACTIVE | COMMUNITY
Start: 1900-01-01

## 2022-02-22 RX ORDER — DOXYCYCLINE HYCLATE 100 MG/1
1 TABLET TABLET, COATED ORAL TWICE A DAY
Status: ACTIVE | COMMUNITY

## 2022-02-22 RX ORDER — ADALIMUMAB 80MG/0.8ML
INJECT 160 MG DAY 1, THEN 80 MG DAY 15 KIT SUBCUTANEOUS
Status: ACTIVE | COMMUNITY

## 2022-02-22 RX ORDER — LISINOPRIL 5 MG/1
TAKE 1 TABLET (5 MG) BY ORAL ROUTE ONCE DAILY TABLET ORAL 1
Qty: 0 | Refills: 0 | Status: ACTIVE | COMMUNITY
Start: 1900-01-01

## 2022-02-22 RX ORDER — ERYTHROMYCIN 20 MG/ML
1 APPLICATION SOLUTION TOPICAL TWICE A DAY
Status: ACTIVE | COMMUNITY

## 2022-02-22 RX ORDER — PREDNISONE 5 MG/1
TAKE 1 TABLET (5 MG) BY ORAL ROUTE 3 TIMES PER DAY TABLET ORAL
Qty: 45 | Refills: 0 | Status: ACTIVE | COMMUNITY
Start: 1900-01-01

## 2022-02-22 RX ORDER — ADALIMUMAB 40MG/0.4ML
INJECT 40 MG KIT SUBCUTANEOUS EVERY OTHER WEEK
Qty: 6 PEN NEEDLE | Refills: 3 | Status: ACTIVE | COMMUNITY
Start: 2022-01-13 | End: 2023-01-08

## 2022-02-22 NOTE — HPI-TODAY'S VISIT:
I sent Betsy to Dr. Mg due to her elevated blood count. There was no abnormaily seen, other than that due to prednisone and her poorly treated crohn's.  Betsy now hesitates and asks me about her red eyes but they are not there now; it resolves over a week and could be iBD eye disease, e.g. episcleritis. I will direct her to [facility] but they don't take ambetter. [place] may see her.  Also has some skin abnormalities and could benefit from Derm consult- will see who in Derm sees ambetter patient.   ============================== 12/22/21  61 yo female got an infected stye. She asks about Entyvio. A friend of a friend did well with Entyvio.  No h/o TB. Non smoker. I do not recommend Entyvio over Humira for her.    =========================== 6/28/21  61 yo female comes in for f/u to assess and discuss treatment options.  I have tried for years to get her on an alternative medication to prednisone.  Finally she accepted Humira but now has second thoughts.  She is concerned about its safety profile.  I said that side effects do occur, but most people tolerate it wel.  Hopefully it will work for her.  Hopefully she will have no or well tolerated side effects. Has been on prednisone for many years. 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 She has depression and [facility]. Maybe if the pred is tapered and hopefully but not certainly d/c, she will be better overall.  On prednisone 20 mg a day.  No abd pain. No diarrhea. No joint aches.  Encouraged her to learn it hersellf Offered to talk with her Humira ambassador but she said they don't do that.  ================================ 6/3/21  She has gotten Humira and a nurse was going to help her get the injection.  [employer] sent alot of pens  Refrigerated. TB test negative.  Current syx: Diarrhea, not that much.  Want to know about the safety. Lots of side effects.  Currently on 15 to 20 mg prednisone Humira as a single agent is safe  Takes 3 BP meds ---- in part due to prednisone  Had to break away and   Humira is better for her than prednison  Has insurance.  Kamari. =================== 5/23/19  Follow-up Crohn's Disease    History Of Present Illness  This is a scheduled appointment for this patient, a 58 year old /Black female, after a previous visit approximately Crohn's disease.     57 yo female with moderate to severe Crohn's disease comes in for 1 year f/u.  Currently on 10 mg pred a day.  In past year, has been up to 20 mg a day.  Offered to get Humira for her but needed pre-tx labs and she did not get them done.  Advised going to the health department.  AGWS.  Typically has 1-2 stools a day. No blood in stool. Sometime has abdominal pain.   ============= 58 yo female with Crohn's disease dx 1998, CD visits with me since 2004 or earlier.  comes in with daughter Júnior Lopez  Past poor response to 6 MP and has used chronic prednisone.  Discussed Humira.  Now we think Stelara might be the best shot.  Currently on prednisone 15 mg a day.  On HCTz'ahd  1 other.  BP ok controll.  Not diabetic.  No other medical problems.  Chronic back problems, lifting daughter.  Works at [employer] $8.75/hr      Past Medical History  Disease Name Date Onset Notes  Crohn's disease unk --   Depression unk --   Hypertension unk --       Past Surgical History  Procedure Name Date Notes  *No Past Surgical History unk 05/23/2019 - 03/09/2018 -       Medication List  Name Date Started Instructions  Lexapro 5 mg oral tablet  take 1 tablet (5 mg) by oral route once daily  lisinopril 5 mg oral tablet  take 1 tablet (5 mg) by oral route once daily  multivitamin oral  --   prednisone 5 mg oral tablet 02/17/2019 TAKE 3 TABLETS BY MOUTH ONCE DAILY  Zofran 4 mg oral tablet  take 2 tablets (8 mg) by oral route daily      Allergy List  Allergen Name Date Reaction Notes  NO KNOWN DRUG ALLERGIES --  --  --   No Known Environmental Allergies --  --  --   No Known Food Allergies --  --  --       Family Medical History  Disease Name Relative/Age Notes  *No Stated Family Medical History Father/ Mother/  no known history      Social History  Finding Status Start/Stop Quantity Notes  Alcohol Never --/-- --  05/23/2019 - 03/09/2018 -   Denies illicit substance abuse --  --/-- --  05/23/2019 -   Denies substance abuse --  --/-- --  05/23/2019 -   Tobacco Former --/-- --  05/23/2019 - 03/09/2018 - 11/03/2016 - 09/04/2015 -       Review of Systems  ConstitutionalDenies : body aches, chills, fatigue, fever, loss of appetite, malaise, night sweats, weight gain, weight loss  EyesDenies : blurred vision, visual changes  HENTDenies : Ear Pain/Ringing, hearing loss, Mouth Ulcers/Sores, nose bleeds, problems with gums/teeth, trouble swallowing  CardiovascularDenies : chest pain, leaky heart valves, heart murmur, heart racing/skipping, high blood pressure, palpitations  RespiratoryDenies : chronic cough, shortness of breath, wheezing or asthma symptoms  GastrointestinalDenies : Abdominal Pain/discomfort, Anal/Rectal Pain or Itching, Anal Spasm, Black Stool, Bloating/belching/gaseouness, Change of Bowel Habit, Constipation, Diarrhea/Loose Stool, Difficulty in Swallowing, Heartburn/esophageal reflux, Hemorrhoids, Indigestion, Mucus in Stool, Nausea/vomiting, Rectal Bleeding, Unintentional Weight Loss  GenitourinaryDenies : Blood in Urine, Burning/pain with Urination, frequency, Recent/frequent UTI, Kidney Stones  IntegumentDenies : itching/dry skin, jaundice, rashes, bumps or sores  NeurologicDenies : headaches, dizziness/vertigo, head trauma/injury, recent numbness/weakness, seizures  MusculoskeletalDenies : back pain, decreased range of motion, joint pain/arthritis, Problems Walking/calf or leg pain  EndocrineDenies : bruise easily, excessive thirst, heat/cold intolerance, history of high or low blood sugar  PsychiatricDenies : anxiety, changes in sleep pattern, depression, loss of memory  Heme-LymphDenies : Bleeding Problems, Enlarged Nodes/Swolen Glands, excessive bruising, history of anemia  Allergic-ImmunologicDenies : seasonal allergies    Vitals  Date Time BP Position Site L\R Cuff Size HR RR TEMP (F) WT  HT  BMI kg/m2 BSA m2 O2 Sat HC     05/23/2019 02:25 /104 Sitting    88 - R  97.9 135lbs 0oz 5'  3" 23.91 1.65        Physical Examination  ConstitutionalAppearance : Well nourished, well developed patient in no distress. Ambulating without difficulty.  Ability to Communicate : Normal communication ability  EyesConjunctivae and Eyelids : Conjunctivae and eyelids appear normal  Sclerae : White without injection  HENTHead :  Inspection : Normocephalic and atraumatic  Face :  Inspection : Face within normal limits  Ears :  External Ears : External ears within normal limits  Nose/Nasopharynx :  External Nose : External nose normal appearance, nares patent, no nasal discharge  Mouth and Throat :  General : Oral cavity appearance normal, breath odor normal  Lips : Appearance normal  NeckInspection and Palpation : Normal appearance without tenderness on palpation or deformities, trachea midline  Thyroid : Normal size without tenderness, nodules or masses  Jugular Veins : no JVD  ChestInspection of Chest : No lesions, deformities or traumatic injuries present. No significant scars are present.  Respiratory Effort : Breathing is unlabored without accessory muscle use  Palpation of Chest : Chest wall non-tender with no masses present. Tactile fremitus is normal  Auscultation : Normal breath sounds  CardiovascularHeart :  Auscultation : Heart rate is regular with normal rhythm. No murmurs are heard. No edema.  GastrointestinalAbdominal Exam : Abdomen soft without rigidity or guarding, abdomen non-tender to palpation, normal bowel sounds, no masses present, non-distended  Liver and Spleen : No hepatomegaly present. Liver is non-tender to palpation and spleen is not palpable.  LymphaticNeck : No lymphadenopathy present  Axillae : No lymphadenopathy present  Groin : No lymphadenopathy present  MusculoskeletalGait and Station : Normal Gait, normal station  Neck/Spine/Pelvis : No tenderness of deformities present.  SkinGeneral Inspection : No rashes, lesions or areas of discoloration present. Skin turgor is normal.  General Palpation : No abnormalities, masses or tenderness on palpation.  Neurologic and PsychiatricOrientation : Oriented to person, place and time  Sensation : Normal sensation  Memory : Short and long term memory intact.  Mood and Affect : Normal mood with an appropriate affect      Assessment  Crohn's Disease     555.2    Plan  OrdersPatient not identified as an unhealthy alcohol user when screene () - - 05/23/2019  Influenza immunization administered or previously received () - - 05/23/2019  BMI screening above normal () - - 05/23/2019  Current tobacco non-user (CAD, cap, COPD, PV) (dm) (IBD) (1036F, ) - - 05/23/2019  Colorectal cancer screening results documented and reviewed (PV) (3017F) - - 05/23/2019  Documentation of current medications. () - - 05/23/2019  InstructionsI have documented a list of current medications and reviewed it with the patient.  I encouraged the patient to diet and exercise.  CorrespondenceCC this document (Wayne Geiger Jr., MD) - 5/23/2019    Same labs as I ordered in 2018. She has started Lexapro, at  5mg, and is being increased to 10 mg a day.   has heart disease and is diabetic.  He has 3 stents.  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                                                                 Electronically Signed by: Curry Ludwig MD -Author on May 23, 2019 03:21:42 PM

## 2022-02-23 LAB
A/G RATIO: 1.2
ALBUMIN: 3.8
ALKALINE PHOSPHATASE: 60
ALT (SGPT): 6
AST (SGOT): 13
BASO (ABSOLUTE): 0.1
BASOS: 0
BILIRUBIN, TOTAL: <0.2
BUN/CREATININE RATIO: 16
BUN: 19
C-REACTIVE PROTEIN, QUANT: 2
CALCIUM: 9.4
CARBON DIOXIDE, TOTAL: 27
CHLORIDE: 101
CREATININE: 1.17
EGFR IF AFRICN AM: 59
EGFR IF NONAFRICN AM: 51
EOS (ABSOLUTE): 0
EOS: 0
GLOBULIN, TOTAL: 3.3
GLUCOSE: 103
HEMATOCRIT: 32.5
HEMATOLOGY COMMENTS:: (no result)
HEMOGLOBIN: 9.6
IMMATURE CELLS: (no result)
IMMATURE GRANS (ABS): 0
IMMATURE GRANULOCYTES: 0
LYMPHS (ABSOLUTE): 0.9
LYMPHS: 7
MCH: 25
MCHC: 29.5
MCV: 85
MONOCYTES(ABSOLUTE): 0.6
MONOCYTES: 5
NEUTROPHILS (ABSOLUTE): 10.7
NEUTROPHILS: 88
NRBC: (no result)
PLATELETS: 425
POTASSIUM: 4.6
PROTEIN, TOTAL: 7.1
RBC: 3.84
RDW: 16.1
SEDIMENTATION RATE-WESTERGREN: 29
SODIUM: 139
WBC: 12.2

## 2022-03-04 ENCOUNTER — TELEPHONE ENCOUNTER (OUTPATIENT)
Dept: URBAN - METROPOLITAN AREA CLINIC 98 | Facility: CLINIC | Age: 62
End: 2022-03-04

## 2022-03-04 RX ORDER — ADALIMUMAB 40MG/0.4ML
0.4 ML KIT SUBCUTANEOUS EVERY OTHER WEEK
Qty: 2 PEN NEEDLE | Refills: 11 | OUTPATIENT
Start: 2022-03-04 | End: 2023-02-27

## 2022-03-04 RX ORDER — ADALIMUMAB 40MG/0.8ML
INJECT 160 MG DAY 1 , THEN 80 MG DAY 15 KIT SUBCUTANEOUS
Qty: 1 KIT | Refills: 0 | OUTPATIENT
Start: 2022-03-04 | End: 2022-03-19

## 2022-03-24 ENCOUNTER — TELEPHONE ENCOUNTER (OUTPATIENT)
Dept: URBAN - METROPOLITAN AREA CLINIC 98 | Facility: CLINIC | Age: 62
End: 2022-03-24

## 2022-03-24 RX ORDER — ADALIMUMAB 80MG/0.8ML
INJECT 160 MG DAY 1, THEN 80 MG DAY 15 KIT SUBCUTANEOUS
Qty: 1 KIT | Refills: 0

## 2022-03-24 RX ORDER — ADALIMUMAB 40MG/0.4ML
0.4 ML KIT SUBCUTANEOUS EVERY OTHER WEEK
Qty: 2 PEN NEEDLE | Refills: 11
Start: 2022-02-22 | End: 2023-03-19

## 2022-04-25 ENCOUNTER — TELEPHONE ENCOUNTER (OUTPATIENT)
Dept: URBAN - METROPOLITAN AREA CLINIC 98 | Facility: CLINIC | Age: 62
End: 2022-04-25

## 2022-04-25 RX ORDER — PREDNISONE 20 MG/1
1 TABLET TABLET ORAL TWICE DAILY
Qty: 60 TABLETS | Refills: 2 | OUTPATIENT
Start: 2022-04-25 | End: 2022-07-24

## 2022-04-25 RX ORDER — PREDNISONE 20 MG/1
1 TABLET TABLET ORAL TWICE DAILY
Qty: 60 TABLETS | Refills: 1
Start: 2022-04-25 | End: 2022-06-24

## 2022-05-23 ENCOUNTER — TELEPHONE ENCOUNTER (OUTPATIENT)
Dept: URBAN - METROPOLITAN AREA CLINIC 98 | Facility: CLINIC | Age: 62
End: 2022-05-23

## 2022-05-23 RX ORDER — ADALIMUMAB 80MG/0.8ML
INJECT 160 MG DAY 1, THEN 80 MG DAY 15 KIT SUBCUTANEOUS
Qty: 1 KIT | Refills: 0
End: 2022-06-22

## 2022-05-23 RX ORDER — ADALIMUMAB 40MG/0.4ML
0.4 ML KIT SUBCUTANEOUS EVERY OTHER WEEK
Qty: 2 PEN NEEDLE | Refills: 11
Start: 2022-02-22 | End: 2023-05-18

## 2022-06-06 ENCOUNTER — TELEPHONE ENCOUNTER (OUTPATIENT)
Dept: URBAN - METROPOLITAN AREA CLINIC 98 | Facility: CLINIC | Age: 62
End: 2022-06-06

## 2022-06-06 RX ORDER — ADALIMUMAB 40MG/0.4ML
0.4 ML KIT SUBCUTANEOUS EVERY OTHER WEEK
Qty: 2 PEN NEEDLE | Refills: 11 | OUTPATIENT
Start: 2022-06-06 | End: 2023-06-01

## 2022-06-15 ENCOUNTER — OFFICE VISIT (OUTPATIENT)
Dept: URBAN - METROPOLITAN AREA CLINIC 98 | Facility: CLINIC | Age: 62
End: 2022-06-15

## 2022-06-15 RX ORDER — ERGOCALCIFEROL 1.25 MG/1
1 CAPSULE CAPSULE, LIQUID FILLED ORAL
Qty: 12 CAPSULES | Refills: 1 | Status: ACTIVE | COMMUNITY
Start: 2022-01-04 | End: 2022-07-03

## 2022-06-15 RX ORDER — PREDNISONE 5 MG/1
TAKE 1 TABLET (5 MG) BY ORAL ROUTE 3 TIMES PER DAY TABLET ORAL
Qty: 45 | Refills: 0 | Status: ACTIVE | COMMUNITY
Start: 1900-01-01

## 2022-06-15 RX ORDER — ONDANSETRON HYDROCHLORIDE 4 MG/1
TAKE 2 TABLETS (8 MG) BY ORAL ROUTE DAILY TABLET, FILM COATED ORAL
Qty: 60 | Refills: 3 | Status: ACTIVE | COMMUNITY
Start: 1900-01-01

## 2022-06-15 RX ORDER — ESCITALOPRAM OXALATE 5 MG
TAKE 1 TABLET (5 MG) BY ORAL ROUTE ONCE DAILY TABLET ORAL 1
Qty: 0 | Refills: 0 | Status: ACTIVE | COMMUNITY
Start: 1900-01-01

## 2022-06-15 RX ORDER — ADALIMUMAB 40MG/0.4ML
0.4 ML KIT SUBCUTANEOUS EVERY OTHER WEEK
Qty: 2 PEN NEEDLE | Refills: 11 | Status: ACTIVE | COMMUNITY
Start: 2022-03-04 | End: 2023-02-27

## 2022-06-15 RX ORDER — ERYTHROMYCIN 20 MG/ML
1 APPLICATION SOLUTION TOPICAL TWICE A DAY
Status: ACTIVE | COMMUNITY

## 2022-06-15 RX ORDER — DOXYCYCLINE HYCLATE 100 MG/1
1 TABLET TABLET, COATED ORAL TWICE A DAY
Status: ACTIVE | COMMUNITY

## 2022-06-15 RX ORDER — LISINOPRIL 5 MG/1
TAKE 1 TABLET (5 MG) BY ORAL ROUTE ONCE DAILY TABLET ORAL 1
Qty: 0 | Refills: 0 | Status: ACTIVE | COMMUNITY
Start: 1900-01-01

## 2022-07-29 ENCOUNTER — OFFICE VISIT (OUTPATIENT)
Dept: URBAN - METROPOLITAN AREA CLINIC 98 | Facility: CLINIC | Age: 62
End: 2022-07-29

## 2022-07-29 VITALS — BODY MASS INDEX: 20.73 KG/M2 | HEIGHT: 63 IN | WEIGHT: 117 LBS

## 2022-07-29 RX ORDER — ESCITALOPRAM OXALATE 5 MG
TAKE 1 TABLET (5 MG) BY ORAL ROUTE ONCE DAILY TABLET ORAL 1
Qty: 0 | Refills: 0 | Status: ACTIVE | COMMUNITY
Start: 1900-01-01

## 2022-07-29 RX ORDER — LISINOPRIL 5 MG/1
TAKE 1 TABLET (5 MG) BY ORAL ROUTE ONCE DAILY TABLET ORAL 1
Qty: 0 | Refills: 0 | Status: ACTIVE | COMMUNITY
Start: 1900-01-01

## 2022-07-29 RX ORDER — ADALIMUMAB 40MG/0.4ML
0.4 ML KIT SUBCUTANEOUS EVERY OTHER WEEK
Qty: 2 PEN NEEDLE | Refills: 11 | Status: ACTIVE | COMMUNITY
Start: 2022-06-06 | End: 2023-06-01

## 2022-07-29 RX ORDER — ONDANSETRON HYDROCHLORIDE 4 MG/1
TAKE 2 TABLETS (8 MG) BY ORAL ROUTE DAILY TABLET, FILM COATED ORAL
Qty: 60 | Refills: 3 | Status: ACTIVE | COMMUNITY
Start: 1900-01-01

## 2022-07-29 RX ORDER — DOXYCYCLINE HYCLATE 100 MG/1
1 TABLET TABLET, COATED ORAL TWICE A DAY
Status: ACTIVE | COMMUNITY

## 2022-07-29 RX ORDER — PREDNISONE 5 MG/1
TAKE 1 TABLET (5 MG) BY ORAL ROUTE 3 TIMES PER DAY TABLET ORAL
Qty: 45 | Refills: 0 | Status: ACTIVE | COMMUNITY
Start: 1900-01-01

## 2022-07-29 RX ORDER — ERYTHROMYCIN 20 MG/ML
1 APPLICATION SOLUTION TOPICAL TWICE A DAY
Status: ACTIVE | COMMUNITY

## 2022-09-13 ENCOUNTER — OFFICE VISIT (OUTPATIENT)
Dept: URBAN - METROPOLITAN AREA CLINIC 98 | Facility: CLINIC | Age: 62
End: 2022-09-13

## 2022-10-03 ENCOUNTER — OFFICE VISIT (OUTPATIENT)
Dept: URBAN - METROPOLITAN AREA CLINIC 98 | Facility: CLINIC | Age: 62
End: 2022-10-03

## 2022-10-12 ENCOUNTER — OFFICE VISIT (OUTPATIENT)
Dept: URBAN - METROPOLITAN AREA CLINIC 98 | Facility: CLINIC | Age: 62
End: 2022-10-12
Payer: SELF-PAY

## 2022-10-12 ENCOUNTER — WEB ENCOUNTER (OUTPATIENT)
Dept: URBAN - METROPOLITAN AREA CLINIC 98 | Facility: CLINIC | Age: 62
End: 2022-10-12

## 2022-10-12 ENCOUNTER — TELEPHONE ENCOUNTER (OUTPATIENT)
Dept: URBAN - METROPOLITAN AREA CLINIC 98 | Facility: CLINIC | Age: 62
End: 2022-10-12

## 2022-10-12 VITALS
HEART RATE: 61 BPM | DIASTOLIC BLOOD PRESSURE: 91 MMHG | HEIGHT: 63 IN | BODY MASS INDEX: 21.16 KG/M2 | TEMPERATURE: 97.3 F | SYSTOLIC BLOOD PRESSURE: 210 MMHG | WEIGHT: 119.4 LBS

## 2022-10-12 DIAGNOSIS — H05.20 ACQUIRED EXOPHTHALMOS: ICD-10-CM

## 2022-10-12 DIAGNOSIS — K50.80 CROHN'S COLITIS: ICD-10-CM

## 2022-10-12 DIAGNOSIS — K50.90 CROHN'S DISEASE: ICD-10-CM

## 2022-10-12 PROBLEM — 18265008: Status: ACTIVE | Noted: 2022-10-12

## 2022-10-12 PROCEDURE — 99214 OFFICE O/P EST MOD 30 MIN: CPT | Performed by: INTERNAL MEDICINE

## 2022-10-12 RX ORDER — LISINOPRIL 5 MG/1
TAKE 1 TABLET (5 MG) BY ORAL ROUTE ONCE DAILY TABLET ORAL 1
Qty: 0 | Refills: 0 | Status: ACTIVE | COMMUNITY
Start: 1900-01-01

## 2022-10-12 RX ORDER — PREDNISONE 5 MG/1
TAKE ONE TABLET TABLET ORAL
Qty: 90 TABLETS | Refills: 3

## 2022-10-12 RX ORDER — DOXYCYCLINE HYCLATE 100 MG/1
1 TABLET TABLET, COATED ORAL TWICE A DAY
Status: ACTIVE | COMMUNITY

## 2022-10-12 RX ORDER — ADALIMUMAB 80MG/0.8ML
INJECT 160 MG DAY 1, THEN 80 MG DAY 15 KIT SUBCUTANEOUS
Qty: 1 KIT | Refills: 0 | OUTPATIENT
Start: 2022-10-12 | End: 2022-10-27

## 2022-10-12 RX ORDER — ESCITALOPRAM OXALATE 5 MG
TAKE 1 TABLET (5 MG) BY ORAL ROUTE ONCE DAILY TABLET ORAL 1
Qty: 0 | Refills: 0 | Status: ACTIVE | COMMUNITY
Start: 1900-01-01

## 2022-10-12 RX ORDER — ERYTHROMYCIN 20 MG/ML
1 APPLICATION SOLUTION TOPICAL TWICE A DAY
Status: ACTIVE | COMMUNITY

## 2022-10-12 RX ORDER — ONDANSETRON HYDROCHLORIDE 4 MG/1
TAKE 2 TABLETS (8 MG) BY ORAL ROUTE DAILY TABLET, FILM COATED ORAL
Qty: 60 | Refills: 3 | Status: ACTIVE | COMMUNITY
Start: 1900-01-01

## 2022-10-12 RX ORDER — ADALIMUMAB 40MG/0.4ML
0.4 ML KIT SUBCUTANEOUS EVERY OTHER WEEK
Qty: 2 PEN NEEDLE | Refills: 11 | OUTPATIENT
Start: 2022-10-12 | End: 2023-10-07

## 2022-10-12 NOTE — HPI-TODAY'S VISIT:
80% counselling.  Reitterated her need for a biologic. Will work with Nurse aldair to arrange.  Also found left axillary lesion she pointed out that looks like hidradenitis rathery than typical node.   Pitting and soft under tissue.  Offered to do breast exam and she declined.   ============================ 4/10/22  I sent Betsy to Dr. Mg due to her elevated blood count. There was no abnormaily seen, other than that due to prednisone and her poorly treated crohn's.  Betsy now hesitates and asks me about her red eyes but they are not there now; it resolves over a week and could be iBD eye disease, e.g. episcleritis. I will direct her to [facility] but they don't take ambetter. [place] may see her.  Also has some skin abnormalities and could benefit from Derm consult- will see who in Derm sees markr patient.   ============================== 12/22/21  59 yo female got an infected stye. She asks about Entyvio. A friend of a friend did well with Entyvio.  No h/o TB. Non smoker. I do not recommend Entyvio over Humira for her.    =========================== 6/28/21  59 yo female comes in for f/u to assess and discuss treatment options.  I have tried for years to get her on an alternative medication to prednisone.  Finally she accepted Humira but now has second thoughts.  She is concerned about its safety profile.  I said that side effects do occur, but most people tolerate it wel.  Hopefully it will work for her.  Hopefully she will have no or well tolerated side effects. Has been on prednisone for many years. 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 She has depression and is advised to go to a community mental health center for eval and guidance. Maybe if the pred is tapered and hopefully but not certainly d/c, she will be better overall.  On prednisone 20 mg a day.  No abd pain. No diarrhea. No joint aches.  Encouraged her to learn it hersellf Offered to talk with her Humira ambassador but she said they don't do that.  ================================ 6/3/21  She has gotten Humira and a nurse was going to help her get the injection.  Jennifer sent alot of pens  Refrigerated. TB test negative.  Current syx: Diarrhea, not that much.  Want to know about the safety. Lots of side effects.  Currently on 15 to 20 mg prednisone Humira as a single agent is safe  Takes 3 BP meds ---- in part due to prednisone  Had to break away and   Humira is better for her than prednison  Has insurance.  [place]. =================== 5/23/19  Follow-up Crohn's Disease    History Of Present Illness  This is a scheduled appointment for this patient, a 58 year old /Black female, after a previous visit approximately Crohn's disease.     57 yo female with moderate to severe Crohn's disease comes in for 1 year f/u.  Currently on 10 mg pred a day.  In past year, has been up to 20 mg a day.  Offered to get Humira for her but needed pre-tx labs and she did not get them done.  Advised going to the health department.  AGWS.  Typically has 1-2 stools a day. No blood in stool. Sometime has abdominal pain.   ============= 56 yo female with Crohn's disease dx 1998, CD visits with me since 2004 or earlier.  comes in with daughter Júnior Lopez  Past poor response to 6 MP and has used chronic prednisone.  Discussed Humira.  Now we think Stelara might be the best shot.  Currently on prednisone 15 mg a day.  On HCTz'ahd  1 other.  BP ok controll.  Not diabetic.  No other medical problems.  Chronic back problems, lifting daughter.  Works at [employer] $8.75/hr      Past Medical History  Disease Name Date Onset Notes  Crohn's disease unk --   Depression unk --   Hypertension unk --       Past Surgical History  Procedure Name Date Notes  *No Past Surgical History unk 05/23/2019 - 03/09/2018 -       Medication List  Name Date Started Instructions  Lexapro 5 mg oral tablet  take 1 tablet (5 mg) by oral route once daily  lisinopril 5 mg oral tablet  take 1 tablet (5 mg) by oral route once daily  multivitamin oral  --   prednisone 5 mg oral tablet 02/17/2019 TAKE 3 TABLETS BY MOUTH ONCE DAILY  Zofran 4 mg oral tablet  take 2 tablets (8 mg) by oral route daily      Allergy List  Allergen Name Date Reaction Notes  NO KNOWN DRUG ALLERGIES --  --  --   No Known Environmental Allergies --  --  --   No Known Food Allergies --  --  --       Family Medical History  Disease Name Relative/Age Notes  *No Stated Family Medical History Father/ Mother/  no known history      Social History  Finding Status Start/Stop Quantity Notes  Alcohol Never --/-- --  05/23/2019 - 03/09/2018 -   Denies illicit substance abuse --  --/-- --  05/23/2019 -   Denies substance abuse --  --/-- --  05/23/2019 -   Tobacco Former --/-- --  05/23/2019 - 03/09/2018 - 11/03/2016 - 09/04/2015 -       Review of Systems  ConstitutionalDenies : body aches, chills, fatigue, fever, loss of appetite, malaise, night sweats, weight gain, weight loss  EyesDenies : blurred vision, visual changes  HENTDenies : Ear Pain/Ringing, hearing loss, Mouth Ulcers/Sores, nose bleeds, problems with gums/teeth, trouble swallowing  CardiovascularDenies : chest pain, leaky heart valves, heart murmur, heart racing/skipping, high blood pressure, palpitations  RespiratoryDenies : chronic cough, shortness of breath, wheezing or asthma symptoms  GastrointestinalDenies : Abdominal Pain/discomfort, Anal/Rectal Pain or Itching, Anal Spasm, Black Stool, Bloating/belching/gaseouness, Change of Bowel Habit, Constipation, Diarrhea/Loose Stool, Difficulty in Swallowing, Heartburn/esophageal reflux, Hemorrhoids, Indigestion, Mucus in Stool, Nausea/vomiting, Rectal Bleeding, Unintentional Weight Loss  GenitourinaryDenies : Blood in Urine, Burning/pain with Urination, frequency, Recent/frequent UTI, Kidney Stones  IntegumentDenies : itching/dry skin, jaundice, rashes, bumps or sores  NeurologicDenies : headaches, dizziness/vertigo, head trauma/injury, recent numbness/weakness, seizures  MusculoskeletalDenies : back pain, decreased range of motion, joint pain/arthritis, Problems Walking/calf or leg pain  EndocrineDenies : bruise easily, excessive thirst, heat/cold intolerance, history of high or low blood sugar  PsychiatricDenies : anxiety, changes in sleep pattern, depression, loss of memory  Heme-LymphDenies : Bleeding Problems, Enlarged Nodes/Swolen Glands, excessive bruising, history of anemia  Allergic-ImmunologicDenies : seasonal allergies    Vitals  Date Time BP Position Site L\R Cuff Size HR RR TEMP (F) WT  HT  BMI kg/m2 BSA m2 O2 Sat HC     05/23/2019 02:25 /104 Sitting    88 - R  97.9 135lbs 0oz 5'  3" 23.91 1.65        Physical Examination  ConstitutionalAppearance : Well nourished, well developed patient in no distress. Ambulating without difficulty.  Ability to Communicate : Normal communication ability  EyesConjunctivae and Eyelids : Conjunctivae and eyelids appear normal  Sclerae : White without injection  HENTHead :  Inspection : Normocephalic and atraumatic  Face :  Inspection : Face within normal limits  Ears :  External Ears : External ears within normal limits  Nose/Nasopharynx :  External Nose : External nose normal appearance, nares patent, no nasal discharge  Mouth and Throat :  General : Oral cavity appearance normal, breath odor normal  Lips : Appearance normal  NeckInspection and Palpation : Normal appearance without tenderness on palpation or deformities, trachea midline  Thyroid : Normal size without tenderness, nodules or masses  Jugular Veins : no JVD  ChestInspection of Chest : No lesions, deformities or traumatic injuries present. No significant scars are present.  Respiratory Effort : Breathing is unlabored without accessory muscle use  Palpation of Chest : Chest wall non-tender with no masses present. Tactile fremitus is normal  Auscultation : Normal breath sounds  CardiovascularHeart :  Auscultation : Heart rate is regular with normal rhythm. No murmurs are heard. No edema.  GastrointestinalAbdominal Exam : Abdomen soft without rigidity or guarding, abdomen non-tender to palpation, normal bowel sounds, no masses present, non-distended  Liver and Spleen : No hepatomegaly present. Liver is non-tender to palpation and spleen is not palpable.  LymphaticNeck : No lymphadenopathy present  Axillae : No lymphadenopathy present  Groin : No lymphadenopathy present  MusculoskeletalGait and Station : Normal Gait, normal station  Neck/Spine/Pelvis : No tenderness of deformities present.  SkinGeneral Inspection : No rashes, lesions or areas of discoloration present. Skin turgor is normal.  General Palpation : No abnormalities, masses or tenderness on palpation.  Neurologic and PsychiatricOrientation : Oriented to person, place and time  Sensation : Normal sensation  Memory : Short and long term memory intact.  Mood and Affect : Normal mood with an appropriate affect      Assessment  Crohn's Disease     555.2    Plan  OrdersPatient not identified as an unhealthy alcohol user when screene () - - 05/23/2019  Influenza immunization administered or previously received () - - 05/23/2019  BMI screening above normal () - - 05/23/2019  Current tobacco non-user (CAD, cap, COPD, PV) (dm) (IBD) (1036F, ) - - 05/23/2019  Colorectal cancer screening results documented and reviewed (PV) (3017F) - - 05/23/2019  Documentation of current medications. () - - 05/23/2019  InstructionsI have documented a list of current medications and reviewed it with the patient.  I encouraged the patient to diet and exercise.  CorrespondenceCC this document (Wayne Geiger Jr., MD) - 5/23/2019    Same labs as I ordered in 2018. She has started Lexapro, at  5mg, and is being increased to 10 mg a day.   has heart disease and is diabetic.  He has 3 stents.  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                                                                 Electronically Signed by: Curry Ludwig MD -Author on May 23, 2019 03:21:42 PM

## 2022-10-14 LAB
A/G RATIO: 1.3
ALBUMIN: 3.9
ALKALINE PHOSPHATASE: 51
ALT (SGPT): 7
AST (SGOT): 14
BASO (ABSOLUTE): 0.1
BASOS: 1
BILIRUBIN, TOTAL: 0.4
BUN/CREATININE RATIO: 23
BUN: 24
C-REACTIVE PROTEIN, QUANT: 4
CALCIUM: 9.8
CARBON DIOXIDE, TOTAL: 24
CHLORIDE: 103
CREATININE: 1.03
EGFR: 62
EOS (ABSOLUTE): 0.1
EOS: 1
FERRITIN, SERUM: 30
FOLATE (FOLIC ACID), SERUM: >20
GLOBULIN, TOTAL: 3.1
GLUCOSE: 82
HEMATOCRIT: 40.7
HEMATOLOGY COMMENTS:: (no result)
HEMOGLOBIN: 12.2
IMMATURE CELLS: (no result)
IMMATURE GRANS (ABS): 0
IMMATURE GRANULOCYTES: 0
IRON BIND.CAP.(TIBC): 365
IRON SATURATION: 24
IRON: 88
LYMPHS (ABSOLUTE): 2
LYMPHS: 20
MCH: 27.2
MCHC: 30
MCV: 91
MONOCYTES(ABSOLUTE): 1
MONOCYTES: 10
NEUTROPHILS (ABSOLUTE): 6.7
NEUTROPHILS: 68
NRBC: (no result)
PLATELETS: 310
POTASSIUM: 4.5
PROTEIN, TOTAL: 7
QUANTIFERON CRITERIA: (no result)
QUANTIFERON INCUBATION: (no result)
QUANTIFERON MITOGEN VALUE: >10
QUANTIFERON NIL VALUE: 0.01
QUANTIFERON TB1 AG VALUE: 0.01
QUANTIFERON TB2 AG VALUE: 0.01
QUANTIFERON-TB GOLD PLUS: NEGATIVE
RBC: 4.49
RDW: 14.3
SEDIMENTATION RATE-WESTERGREN: 5
SODIUM: 145
UIBC: 277
VITAMIN B12: 547
VITAMIN D, 25-HYDROXY: 28.7
WBC: 9.9

## 2023-01-13 ENCOUNTER — APPOINTMENT (RX ONLY)
Dept: URBAN - METROPOLITAN AREA CLINIC 50 | Facility: CLINIC | Age: 63
Setting detail: DERMATOLOGY
End: 2023-01-13

## 2023-01-13 DIAGNOSIS — L21.8 OTHER SEBORRHEIC DERMATITIS: ICD-10-CM

## 2023-01-13 PROCEDURE — ? PRESCRIPTION

## 2023-01-13 PROCEDURE — 99203 OFFICE O/P NEW LOW 30 MIN: CPT

## 2023-01-13 PROCEDURE — ? TREATMENT REGIMEN

## 2023-01-13 PROCEDURE — ? COUNSELING

## 2023-01-13 RX ORDER — HYDROCORTISONE 25 MG/G
OINTMENT TOPICAL BID
Qty: 28.35 | Refills: 2 | Status: ERX | COMMUNITY
Start: 2023-01-13

## 2023-01-13 RX ADMIN — HYDROCORTISONE: 25 OINTMENT TOPICAL at 00:00

## 2023-01-13 ASSESSMENT — LOCATION ZONE DERM: LOCATION ZONE: FACE

## 2023-01-13 ASSESSMENT — LOCATION SIMPLE DESCRIPTION DERM: LOCATION SIMPLE: LEFT FOREHEAD

## 2023-01-13 ASSESSMENT — LOCATION DETAILED DESCRIPTION DERM: LOCATION DETAILED: LEFT MEDIAL FOREHEAD

## 2023-01-13 NOTE — HPI: DISCOLORATION
How Severe Is Your Skin Discoloration?: mild
Additional History: Pt states that she would also like to discuss skin tightening treatments.

## 2023-01-13 NOTE — PROCEDURE: TREATMENT REGIMEN
Initiate Treatment: hydrocortisone 2.5 % topical ointment Bid\\nSig: Apply thin layer to affected areas on face twice daily for two weeks. Stop for one week, repeat as needed for flares
Samples Given: La Roche Posay and cerave cleansers and moisturizers
Detail Level: Zone

## 2023-02-07 ENCOUNTER — APPOINTMENT (RX ONLY)
Dept: URBAN - METROPOLITAN AREA CLINIC 50 | Facility: CLINIC | Age: 63
Setting detail: DERMATOLOGY
End: 2023-02-07

## 2023-02-07 DIAGNOSIS — L21.8 OTHER SEBORRHEIC DERMATITIS: ICD-10-CM | Status: IMPROVED

## 2023-02-07 DIAGNOSIS — L85.3 XEROSIS CUTIS: ICD-10-CM

## 2023-02-07 PROCEDURE — ? COUNSELING

## 2023-02-07 PROCEDURE — ? PRESCRIPTION

## 2023-02-07 PROCEDURE — ? TREATMENT REGIMEN

## 2023-02-07 PROCEDURE — 99213 OFFICE O/P EST LOW 20 MIN: CPT

## 2023-02-07 RX ORDER — KETOCONAZOLE 20 MG/G
THIN LAYER CREAM TOPICAL BID
Qty: 60 | Refills: 3 | Status: ERX | COMMUNITY
Start: 2023-02-07

## 2023-02-07 RX ADMIN — KETOCONAZOLE THIN LAYER: 20 CREAM TOPICAL at 00:00

## 2023-02-07 ASSESSMENT — LOCATION SIMPLE DESCRIPTION DERM
LOCATION SIMPLE: RIGHT HAND
LOCATION SIMPLE: RIGHT FOREARM
LOCATION SIMPLE: LEFT FOREHEAD
LOCATION SIMPLE: LEFT FOREARM
LOCATION SIMPLE: LEFT HAND

## 2023-02-07 ASSESSMENT — LOCATION DETAILED DESCRIPTION DERM
LOCATION DETAILED: RIGHT RADIAL DORSAL HAND
LOCATION DETAILED: RIGHT PROXIMAL DORSAL FOREARM
LOCATION DETAILED: LEFT MEDIAL FOREHEAD
LOCATION DETAILED: LEFT DISTAL DORSAL FOREARM
LOCATION DETAILED: LEFT ULNAR DORSAL HAND

## 2023-02-07 ASSESSMENT — LOCATION ZONE DERM
LOCATION ZONE: FACE
LOCATION ZONE: ARM
LOCATION ZONE: HAND

## 2023-02-07 NOTE — HPI: SECONDARY COMPLAINT
How Severe Is This Condition?: moderate
Additional History: Patient states she battles with dry skin and would like recommendations on products to use to help with it.

## 2023-02-07 NOTE — PROCEDURE: TREATMENT REGIMEN
Initiate Treatment: KETOCONAZOLE 2% CREAM 2-3 times weekly as maintenance
Discontinue Regimen: HYDROCORTISONE 2.5% OINTMENT (may repeat treatment if needed)
Otc Regimen: SPF QAM (La Roche Posay double repair UV moisturizer) \\nGentle unscented cleanser twice daily (Cerave Cream to foam cleanser)
Detail Level: Zone
Plan: Advised patient to use a cream based moisturize rather than a lotion based.
Samples Given: Eucerin advance repair cream \\nCerave moisturizing cream

## 2023-06-07 ENCOUNTER — TELEPHONE ENCOUNTER (OUTPATIENT)
Dept: URBAN - METROPOLITAN AREA CLINIC 98 | Facility: CLINIC | Age: 63
End: 2023-06-07

## 2023-06-09 ENCOUNTER — TELEPHONE ENCOUNTER (OUTPATIENT)
Dept: URBAN - METROPOLITAN AREA CLINIC 98 | Facility: CLINIC | Age: 63
End: 2023-06-09

## 2023-06-09 RX ORDER — PREDNISONE 10 MG/1
1 TABLET TABLET ORAL TWICE DAILY
Qty: 60 TABLETS | Refills: 0 | OUTPATIENT
Start: 2023-06-09 | End: 2023-07-09

## 2023-07-05 ENCOUNTER — OFFICE VISIT (OUTPATIENT)
Dept: URBAN - METROPOLITAN AREA CLINIC 98 | Facility: CLINIC | Age: 63
End: 2023-07-05
Payer: SELF-PAY

## 2023-07-05 VITALS — BODY MASS INDEX: 22.09 KG/M2 | WEIGHT: 124.7 LBS | HEIGHT: 63 IN

## 2023-07-05 DIAGNOSIS — Z79.620 LONG TERM (CURRENT) USE OF IMMUNOSUPPRESSIVE BIOLOGIC: ICD-10-CM

## 2023-07-05 DIAGNOSIS — R10.84 GENERALIZED ABDOMINAL PAIN: ICD-10-CM

## 2023-07-05 DIAGNOSIS — R19.7 DIARRHEA: ICD-10-CM

## 2023-07-05 DIAGNOSIS — H05.20 ACQUIRED EXOPHTHALMOS: ICD-10-CM

## 2023-07-05 DIAGNOSIS — K50.90 CROHN'S DISEASE: ICD-10-CM

## 2023-07-05 PROCEDURE — 993 APS NON BILLABLE: Performed by: INTERNAL MEDICINE

## 2023-07-05 RX ORDER — PREDNISONE 20 MG/1
1 TABLET TABLET ORAL ONCE A DAY
Status: ACTIVE | COMMUNITY

## 2023-07-05 RX ORDER — ERYTHROMYCIN 20 MG/ML
1 APPLICATION SOLUTION TOPICAL TWICE A DAY
Status: ACTIVE | COMMUNITY

## 2023-07-05 RX ORDER — ESCITALOPRAM OXALATE 5 MG
TAKE 1 TABLET (5 MG) BY ORAL ROUTE ONCE DAILY TABLET ORAL 1
Qty: 0 | Refills: 0 | Status: ACTIVE | COMMUNITY
Start: 1900-01-01

## 2023-07-05 RX ORDER — ONDANSETRON HYDROCHLORIDE 4 MG/1
TAKE 2 TABLETS (8 MG) BY ORAL ROUTE DAILY TABLET, FILM COATED ORAL
Qty: 60 | Refills: 3 | Status: ACTIVE | COMMUNITY
Start: 1900-01-01

## 2023-07-05 RX ORDER — DOXYCYCLINE HYCLATE 100 MG/1
1 TABLET TABLET, COATED ORAL TWICE A DAY
Status: ACTIVE | COMMUNITY

## 2023-07-05 RX ORDER — LISINOPRIL 5 MG/1
TAKE 1 TABLET (5 MG) BY ORAL ROUTE ONCE DAILY TABLET ORAL 1
Qty: 0 | Refills: 0 | Status: ACTIVE | COMMUNITY
Start: 1900-01-01

## 2023-07-05 RX ORDER — VEDOLIZUMAB 300 MG/5ML
AS DIRECTED INJECTION, POWDER, LYOPHILIZED, FOR SOLUTION INTRAVENOUS
Qty: 300 | OUTPATIENT
Start: 2023-07-05 | End: 2023-10-03

## 2023-07-05 NOTE — PHYSICAL EXAM RECTAL:
normal tone, no external hemorrhoids, no masses palpable, no red blood, Tenderness on KRISTAL, Internal hemorrhoids present

## 2023-07-05 NOTE — HPI-TODAY'S VISIT:
61 yo female with Crohn's disease comes in for f/u.  Called recently for prednisone refill and I asked that she come in for a f2f  visit.  I encouraged and explained to her the need to take a biologic to get her off prednisone and she understood, but after getting the Humira at home, never started it. She says she is concerned about the side effects, though reassured that those are typically mild if any, and especially with Humira monotherapy. Also, I pointed out the severe metabolic and other side effects of prednisone. She takes prednisone 5-10 mg a day for years; On prednisone 5-10 mg a day, she has 1-2 stools a day without blood and only occastional pain and no ER visits.  But it has not healed her colon. When she tries to taper and stope the prednisone, her syx worsen with pain and diarrhea, rare blood.  Has ambetter insurance. She wants Entyvio due to its better safety.  That is fine but Humira was chosen before she had any insurance and infusion difficulty/expense to her weighed in the decision.  She now says she wants Entyvio. That is fine but surgical resection is another option for her.  Last colonoscopy 2/27/21 showed severe Crohn's colitis.  Due to rectal disease - moderate proctitis ---- a resection may be challenging, otherwise, a colectomy with ileo rectal anastomosis (ileum is nl) would be a good option.  She does not want to see Dr. Mckenna if she does not want Entyvio, Entyvio is ineffective, or if she just wants to proceed with a colectomy.  Might end up needing ileostomy but hopefuly not.   ===============================   10/12/22  80% counselling.  Reitterated her need for a biologic. Will work with Nurse aldair to arrange.  Also found left axillary lesion she pointed out that looks like hidradenitis rathery than typical node.   Pitting and soft under tissue.  Offered to do breast exam and she declined.   ============================ 4/10/22  I sent Betsy to Dr. Mg due to her elevated blood count. There was no abnormaily seen, other than that due to prednisone and her poorly treated crohn's.  Betsy now hesitates and asks me about her red eyes but they are not there now; it resolves over a week and could be iBD eye disease, e.g. episcleritis. I will direct her to [facility] but they don't take ambetter. [place] may see her.  Also has some skin abnormalities and could benefit from Derm consult- will see who in Derm sees ambetter patient.   ============================== 12/22/21  61 yo female got an infected stye. She asks about Entyvio. A friend of a friend did well with Entyvio.  No h/o TB. Non smoker. I do not recommend Entyvio over Humira for her.    =========================== 6/28/21  61 yo female comes in for f/u to assess and discuss treatment options.  I have tried for years to get her on an alternative medication to prednisone.  Finally she accepted Humira but now has second thoughts.  She is concerned about its safety profile.  I said that side effects do occur, but most people tolerate it wel.  Hopefully it will work for her.  Hopefully she will have no or well tolerated side effects. Has been on prednisone for many years. 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 She has depression and is advised to go to a community mental health center for eval and guidance. Maybe if the pred is tapered and hopefully but not certainly d/c, she will be better overall.  On prednisone 20 mg a day.  No abd pain. No diarrhea. No joint aches.  Encouraged her to learn it hersellf Offered to talk with her Humira ambassador but she said they don't do that.  ================================ 6/3/21  She has gotten Humira and a nurse was going to help her get the injection.  Jennifer sent alot of pens  Refrigerated. TB test negative.  Current syx: Diarrhea, not that much.  Want to know about the safety. Lots of side effects.  Currently on 15 to 20 mg prednisone Humira as a single agent is safe  Takes 3 BP meds ---- in part due to prednisone  Had to break away and   Humira is better for her than prednison  Has insurance.  [place]. =================== 5/23/19  Follow-up Crohn's Disease    History Of Present Illness  This is a scheduled appointment for this patient, a 58 year old /Black female, after a previous visit approximately Crohn's disease.     59 yo female with moderate to severe Crohn's disease comes in for 1 year f/u.  Currently on 10 mg pred a day.  In past year, has been up to 20 mg a day.  Offered to get Humira for her but needed pre-tx labs and she did not get them done.  Advised going to the health department.  AGWS.  Typically has 1-2 stools a day. No blood in stool. Sometime has abdominal pain.   ============= 58 yo female with Crohn's disease dx 1998, CD visits with me since 2004 or earlier.  comes in with daughter Júnior Lopez  Past poor response to 6 MP and has used chronic prednisone.  Discussed Humira.  Now we think Stelara might be the best shot.  Currently on prednisone 15 mg a day.  On HCTz'ahd  1 other.  BP ok controll.  Not diabetic.  No other medical problems.  Chronic back problems, lifting daughter.  Works at [employer] $8.75/hr      Past Medical History  Disease Name Date Onset Notes  Crohn's disease unk --   Depression unk --   Hypertension unk --       Past Surgical History  Procedure Name Date Notes  *No Past Surgical History unk 05/23/2019 - 03/09/2018 -       Medication List  Name Date Started Instructions  Lexapro 5 mg oral tablet  take 1 tablet (5 mg) by oral route once daily  lisinopril 5 mg oral tablet  take 1 tablet (5 mg) by oral route once daily  multivitamin oral  --   prednisone 5 mg oral tablet 02/17/2019 TAKE 3 TABLETS BY MOUTH ONCE DAILY  Zofran 4 mg oral tablet  take 2 tablets (8 mg) by oral route daily      Allergy List  Allergen Name Date Reaction Notes  NO KNOWN DRUG ALLERGIES --  --  --   No Known Environmental Allergies --  --  --   No Known Food Allergies --  --  --       Family Medical History  Disease Name Relative/Age Notes  *No Stated Family Medical History Father/ Mother/  no known history      Social History  Finding Status Start/Stop Quantity Notes  Alcohol Never --/-- --  05/23/2019 - 03/09/2018 -   Denies illicit substance abuse --  --/-- --  05/23/2019 -   Denies substance abuse --  --/-- --  05/23/2019 -   Tobacco Former --/-- --  05/23/2019 - 03/09/2018 - 11/03/2016 - 09/04/2015 -       Review of Systems  ConstitutionalDenies : body aches, chills, fatigue, fever, loss of appetite, malaise, night sweats, weight gain, weight loss  EyesDenies : blurred vision, visual changes  HENTDenies : Ear Pain/Ringing, hearing loss, Mouth Ulcers/Sores, nose bleeds, problems with gums/teeth, trouble swallowing  CardiovascularDenies : chest pain, leaky heart valves, heart murmur, heart racing/skipping, high blood pressure, palpitations  RespiratoryDenies : chronic cough, shortness of breath, wheezing or asthma symptoms  GastrointestinalDenies : Abdominal Pain/discomfort, Anal/Rectal Pain or Itching, Anal Spasm, Black Stool, Bloating/belching/gaseouness, Change of Bowel Habit, Constipation, Diarrhea/Loose Stool, Difficulty in Swallowing, Heartburn/esophageal reflux, Hemorrhoids, Indigestion, Mucus in Stool, Nausea/vomiting, Rectal Bleeding, Unintentional Weight Loss  GenitourinaryDenies : Blood in Urine, Burning/pain with Urination, frequency, Recent/frequent UTI, Kidney Stones  IntegumentDenies : itching/dry skin, jaundice, rashes, bumps or sores  NeurologicDenies : headaches, dizziness/vertigo, head trauma/injury, recent numbness/weakness, seizures  MusculoskeletalDenies : back pain, decreased range of motion, joint pain/arthritis, Problems Walking/calf or leg pain  EndocrineDenies : bruise easily, excessive thirst, heat/cold intolerance, history of high or low blood sugar  PsychiatricDenies : anxiety, changes in sleep pattern, depression, loss of memory  Heme-LymphDenies : Bleeding Problems, Enlarged Nodes/Swolen Glands, excessive bruising, history of anemia  Allergic-ImmunologicDenies : seasonal allergies    Vitals  Date Time BP Position Site L\R Cuff Size HR RR TEMP (F) WT  HT  BMI kg/m2 BSA m2 O2 Sat HC     05/23/2019 02:25 /104 Sitting    88 - R  97.9 135lbs 0oz 5'  3" 23.91 1.65        Physical Examination  ConstitutionalAppearance : Well nourished, well developed patient in no distress. Ambulating without difficulty.  Ability to Communicate : Normal communication ability  EyesConjunctivae and Eyelids : Conjunctivae and eyelids appear normal  Sclerae : White without injection  HENTHead :  Inspection : Normocephalic and atraumatic  Face :  Inspection : Face within normal limits  Ears :  External Ears : External ears within normal limits  Nose/Nasopharynx :  External Nose : External nose normal appearance, nares patent, no nasal discharge  Mouth and Throat :  General : Oral cavity appearance normal, breath odor normal  Lips : Appearance normal  NeckInspection and Palpation : Normal appearance without tenderness on palpation or deformities, trachea midline  Thyroid : Normal size without tenderness, nodules or masses  Jugular Veins : no JVD  ChestInspection of Chest : No lesions, deformities or traumatic injuries present. No significant scars are present.  Respiratory Effort : Breathing is unlabored without accessory muscle use  Palpation of Chest : Chest wall non-tender with no masses present. Tactile fremitus is normal  Auscultation : Normal breath sounds  CardiovascularHeart :  Auscultation : Heart rate is regular with normal rhythm. No murmurs are heard. No edema.  GastrointestinalAbdominal Exam : Abdomen soft without rigidity or guarding, abdomen non-tender to palpation, normal bowel sounds, no masses present, non-distended  Liver and Spleen : No hepatomegaly present. Liver is non-tender to palpation and spleen is not palpable.  LymphaticNeck : No lymphadenopathy present  Axillae : No lymphadenopathy present  Groin : No lymphadenopathy present  MusculoskeletalGait and Station : Normal Gait, normal station  Neck/Spine/Pelvis : No tenderness of deformities present.  SkinGeneral Inspection : No rashes, lesions or areas of discoloration present. Skin turgor is normal.  General Palpation : No abnormalities, masses or tenderness on palpation.  Neurologic and PsychiatricOrientation : Oriented to person, place and time  Sensation : Normal sensation  Memory : Short and long term memory intact.  Mood and Affect : Normal mood with an appropriate affect      Assessment  Crohn's Disease     555.2    Plan  OrdersPatient not identified as an unhealthy alcohol user when screene () - - 05/23/2019  Influenza immunization administered or previously received () - - 05/23/2019  BMI screening above normal () - - 05/23/2019  Current tobacco non-user (CAD, cap, COPD, PV) (dm) (IBD) (1036F, ) - - 05/23/2019  Colorectal cancer screening results documented and reviewed (PV) (3017F) - - 05/23/2019  Documentation of current medications. () - - 05/23/2019  InstructionsI have documented a list of current medications and reviewed it with the patient.  I encouraged the patient to diet and exercise.  CorrespondenceCC this document (Wayne Geiger Jr., MD) - 5/23/2019    Same labs as I ordered in 2018. She has started Lexapro, at  5mg, and is being increased to 10 mg a day.   has heart disease and is diabetic.  He has 3 stents.  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                                                                 Electronically Signed by: Curry Ludwig MD -Author on May 23, 2019 03:21:42 PM

## 2023-07-08 LAB
A/G RATIO: 1.4
ALBUMIN: 3.7
ALKALINE PHOSPHATASE: 63
ALT (SGPT): 8
AST (SGOT): 15
BASO (ABSOLUTE): 0
BASOS: 0
BILIRUBIN, TOTAL: 0.2
BUN/CREATININE RATIO: 27
BUN: 29
C-REACTIVE PROTEIN, QUANT: 4
CALCIUM: 9.4
CARBON DIOXIDE, TOTAL: 28
CHLORIDE: 102
CREATININE: 1.09
EGFR: 58
EOS (ABSOLUTE): 0.1
EOS: 1
FOLATE (FOLIC ACID), SERUM: >20
GLOBULIN, TOTAL: 2.7
GLUCOSE: 117
HBSAG SCREEN: NEGATIVE
HEMATOCRIT: 35.6
HEMATOLOGY COMMENTS:: (no result)
HEMOGLOBIN: 11.2
HEP A AB, TOTAL: POSITIVE
HEP B CORE AB, IGM: NEGATIVE
HEP B CORE AB, TOT: NEGATIVE
HEP B SURFACE AB, QUAL: NON REACTIVE
IMMATURE CELLS: (no result)
IMMATURE GRANS (ABS): 0
IMMATURE GRANULOCYTES: 0
LYMPHS (ABSOLUTE): 0.9
LYMPHS: 8
MCH: 27.6
MCHC: 31.5
MCV: 88
MONOCYTES(ABSOLUTE): 0.6
MONOCYTES: 5
NEUTROPHILS (ABSOLUTE): 8.6
NEUTROPHILS: 86
NRBC: (no result)
PLATELETS: 308
POTASSIUM: 4.2
PROTEIN, TOTAL: 6.4
QUANTIFERON CRITERIA: (no result)
QUANTIFERON INCUBATION: (no result)
QUANTIFERON MITOGEN VALUE: >10
QUANTIFERON NIL VALUE: 0.18
QUANTIFERON TB1 AG VALUE: 0.05
QUANTIFERON TB2 AG VALUE: 0.05
QUANTIFERON-TB GOLD PLUS: NEGATIVE
RBC: 4.06
RDW: 14.5
SEDIMENTATION RATE-WESTERGREN: 11
SODIUM: 142
VITAMIN B12: 575
VITAMIN D, 25-HYDROXY: 34.1
WBC: 10.2

## 2023-07-18 ENCOUNTER — TELEPHONE ENCOUNTER (OUTPATIENT)
Dept: URBAN - METROPOLITAN AREA CLINIC 97 | Facility: CLINIC | Age: 63
End: 2023-07-18

## 2023-08-03 ENCOUNTER — OFFICE VISIT (OUTPATIENT)
Dept: URBAN - METROPOLITAN AREA TELEHEALTH 2 | Facility: TELEHEALTH | Age: 63
End: 2023-08-03
Payer: SELF-PAY

## 2023-08-03 VITALS — BODY MASS INDEX: 21.97 KG/M2 | HEIGHT: 63 IN | WEIGHT: 124 LBS

## 2023-08-03 DIAGNOSIS — K50.90 CROHN'S DISEASE: ICD-10-CM

## 2023-08-03 PROCEDURE — 99214 OFFICE O/P EST MOD 30 MIN: CPT | Performed by: INTERNAL MEDICINE

## 2023-08-03 RX ORDER — PREDNISONE 20 MG/1
1 TABLET TABLET ORAL ONCE A DAY
Status: ACTIVE | COMMUNITY

## 2023-08-03 RX ORDER — LISINOPRIL 5 MG/1
TAKE 1 TABLET (5 MG) BY ORAL ROUTE ONCE DAILY TABLET ORAL 1
Qty: 0 | Refills: 0 | Status: ACTIVE | COMMUNITY
Start: 1900-01-01

## 2023-08-03 RX ORDER — ONDANSETRON HYDROCHLORIDE 4 MG/1
TAKE 2 TABLETS (8 MG) BY ORAL ROUTE DAILY TABLET, FILM COATED ORAL
Qty: 60 | Refills: 3 | Status: ACTIVE | COMMUNITY
Start: 1900-01-01

## 2023-08-03 RX ORDER — ESCITALOPRAM OXALATE 5 MG
TAKE 1 TABLET (5 MG) BY ORAL ROUTE ONCE DAILY TABLET ORAL 1
Qty: 0 | Refills: 0 | Status: ACTIVE | COMMUNITY
Start: 1900-01-01

## 2023-08-03 RX ORDER — ERYTHROMYCIN 20 MG/ML
1 APPLICATION SOLUTION TOPICAL TWICE A DAY
Status: ACTIVE | COMMUNITY

## 2023-08-03 RX ORDER — VEDOLIZUMAB 300 MG/5ML
AS DIRECTED INJECTION, POWDER, LYOPHILIZED, FOR SOLUTION INTRAVENOUS
Qty: 300 | Status: ACTIVE | COMMUNITY
Start: 2023-07-05 | End: 2023-10-03

## 2023-08-03 RX ORDER — DOXYCYCLINE HYCLATE 100 MG/1
1 TABLET TABLET, COATED ORAL TWICE A DAY
Status: ACTIVE | COMMUNITY

## 2023-08-03 NOTE — HPI-TODAY'S VISIT:
Called her and discussed Entyvio and its risks which are favorable compared to especially prednisone and also  to other biologics.    She asked how she would feel  She asked about dental extraction.  Suggest the dental extraction be done in the next month, before she starts Entyvio, to simplify and eliminate any questions or uncertainty  Lengthy discussion.  I tried to reassure her -   25 minutes -- 2 phone calls as she could not do teleheatlth (5:30p-5:55p) [id number]    =========================== 7/5/23  61 yo female with Crohn's disease comes in for f/u.  Called recently for prednisone refill and I asked that she come in for a f2f  visit.  I encouraged and explained to her the need to take a biologic to get her off prednisone and she understood, but after getting the Humira at home, never started it. She says she is concerned about the side effects, though reassured that those are typically mild if any, and especially with Humira monotherapy. Also, I pointed out the severe metabolic and other side effects of prednisone. She takes prednisone 5-10 mg a day for years; On prednisone 5-10 mg a day, she has 1-2 stools a day without blood and only occastional pain and no ER visits.  But it has not healed her colon. When she tries to taper and stope the prednisone, her syx worsen with pain and diarrhea, rare blood.  Has ambetter insurance. She wants Entyvio due to its better safety.  That is fine but Humira was chosen before she had any insurance and infusion difficulty/expense to her weighed in the decision.  She now says she wants Entyvio. That is fine but surgical resection is another option for her.  Last colonoscopy 2/27/21 showed severe Crohn's colitis.  Due to rectal disease - moderate proctitis ---- a resection may be challenging, otherwise, a colectomy with ileo rectal anastomosis (ileum is nl) would be a good option.  She does not want to see Dr. Mckenna if she does not want Entyvio, Entyvio is ineffective, or if she just wants to proceed with a colectomy.  Might end up needing ileostomy but hopefuly not.   ===============================   10/12/22  80% counselling.  Reitterated her need for a biologic. Will work with Nurse aldair to arrange.  Also found left axillary lesion she pointed out that looks like hidradenitis rathery than typical node.   Pitting and soft under tissue.  Offered to do breast exam and she declined.   ============================ 4/10/22  I sent Betsy to Dr. Mg due to her elevated blood count. There was no abnormaily seen, other than that due to prednisone and her poorly treated crohn's.  Betsy now hesitates and asks me about her red eyes but they are not there now; it resolves over a week and could be iBD eye disease, e.g. episcleritis. I will direct her to [facility] but they don't take ambetter. [place] may see her.  Also has some skin abnormalities and could benefit from Derm consult- will see who in Derm sees ambetter patient.   ============================== 12/22/21  61 yo female got an infected stye. She asks about Entyvio. A friend of a friend did well with Entyvio.  No h/o TB. Non smoker. I do not recommend Entyvio over Humira for her.    =========================== 6/28/21  61 yo female comes in for f/u to assess and discuss treatment options.  I have tried for years to get her on an alternative medication to prednisone.  Finally she accepted Humira but now has second thoughts.  She is concerned about its safety profile.  I said that side effects do occur, but most people tolerate it wel.  Hopefully it will work for her.  Hopefully she will have no or well tolerated side effects. Has been on prednisone for many years. 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 She has depression and is advised [facility] and guidance. Maybe if the pred is tapered and hopefully but not certainly d/c, she will be better overall.  On prednisone 20 mg a day.  No abd pain. No diarrhea. No joint aches.  Encouraged her to learn it hersellf Offered to talk with her Humira ambassador but she said they don't do that.  ================================ 6/3/21  She has gotten Humira and a nurse was going to help her get the injection.  Jennifer sent alot of pens  Refrigerated. TB test negative.  Current syx: Diarrhea, not that much.  Want to know about the safety. Lots of side effects.  Currently on 15 to 20 mg prednisone Humira as a single agent is safe  Takes 3 BP meds ---- in part due to prednisone  Had to break away and   Humira is better for her than prednison  Has insurance.  Ambetter. =================== 5/23/19  Follow-up Crohn's Disease    History Of Present Illness  This is a scheduled appointment for this patient, a 58 year old /Black female, after a previous visit approximately Crohn's disease.     57 yo female with moderate to severe Crohn's disease comes in for 1 year f/u.  Currently on 10 mg pred a day.  In past year, has been up to 20 mg a day.  Offered to get Humira for her but needed pre-tx labs and she did not get them done.  Advised going to the health department.  AGWS.  Typically has 1-2 stools a day. No blood in stool. Sometime has abdominal pain.   ============= 56 yo female with Crohn's disease dx 1998, CD visits with me since 2004 or earlier.  comes in with daughter Júnior Lopez  Past poor response to 6 MP and has used chronic prednisone.  Discussed Humira.  Now we think Stelara might be the best shot.  Currently on prednisone 15 mg a day.  On HCTz'ahd  1 other.  BP ok controll.  Not diabetic.  No other medical problems.  Chronic back problems, lifting daughter.  Works at [employer] $8.75/hr      Past Medical History  Disease Name Date Onset Notes  Crohn's disease unk --   Depression unk --   Hypertension unk --       Past Surgical History  Procedure Name Date Notes  *No Past Surgical History unk 05/23/2019 - 03/09/2018 -       Medication List  Name Date Started Instructions  Lexapro 5 mg oral tablet  take 1 tablet (5 mg) by oral route once daily  lisinopril 5 mg oral tablet  take 1 tablet (5 mg) by oral route once daily  multivitamin oral  --   prednisone 5 mg oral tablet 02/17/2019 TAKE 3 TABLETS BY MOUTH ONCE DAILY  Zofran 4 mg oral tablet  take 2 tablets (8 mg) by oral route daily      Allergy List  Allergen Name Date Reaction Notes  NO KNOWN DRUG ALLERGIES --  --  --   No Known Environmental Allergies --  --  --   No Known Food Allergies --  --  --       Family Medical History  Disease Name Relative/Age Notes  *No Stated Family Medical History Father/ Mother/  no known history      Social History  Finding Status Start/Stop Quantity Notes  Alcohol Never --/-- --  05/23/2019 - 03/09/2018 -   Denies illicit substance abuse --  --/-- --  05/23/2019 -   Denies substance abuse --  --/-- --  05/23/2019 -   Tobacco Former --/-- --  05/23/2019 - 03/09/2018 - 11/03/2016 - 09/04/2015 -       Review of Systems  ConstitutionalDenies : body aches, chills, fatigue, fever, loss of appetite, malaise, night sweats, weight gain, weight loss  EyesDenies : blurred vision, visual changes  HENTDenies : Ear Pain/Ringing, hearing loss, Mouth Ulcers/Sores, nose bleeds, problems with gums/teeth, trouble swallowing  CardiovascularDenies : chest pain, leaky heart valves, heart murmur, heart racing/skipping, high blood pressure, palpitations  RespiratoryDenies : chronic cough, shortness of breath, wheezing or asthma symptoms  GastrointestinalDenies : Abdominal Pain/discomfort, Anal/Rectal Pain or Itching, Anal Spasm, Black Stool, Bloating/belching/gaseouness, Change of Bowel Habit, Constipation, Diarrhea/Loose Stool, Difficulty in Swallowing, Heartburn/esophageal reflux, Hemorrhoids, Indigestion, Mucus in Stool, Nausea/vomiting, Rectal Bleeding, Unintentional Weight Loss  GenitourinaryDenies : Blood in Urine, Burning/pain with Urination, frequency, Recent/frequent UTI, Kidney Stones  IntegumentDenies : itching/dry skin, jaundice, rashes, bumps or sores  NeurologicDenies : headaches, dizziness/vertigo, head trauma/injury, recent numbness/weakness, seizures  MusculoskeletalDenies : back pain, decreased range of motion, joint pain/arthritis, Problems Walking/calf or leg pain  EndocrineDenies : bruise easily, excessive thirst, heat/cold intolerance, history of high or low blood sugar  PsychiatricDenies : anxiety, changes in sleep pattern, depression, loss of memory  Heme-LymphDenies : Bleeding Problems, Enlarged Nodes/Swolen Glands, excessive bruising, history of anemia  Allergic-ImmunologicDenies : seasonal allergies    Vitals  Date Time BP Position Site L\R Cuff Size HR RR TEMP (F) WT  HT  BMI kg/m2 BSA m2 O2 Sat HC     05/23/2019 02:25 /104 Sitting    88 - R  97.9 135lbs 0oz 5'  3" 23.91 1.65        Physical Examination  ConstitutionalAppearance : Well nourished, well developed patient in no distress. Ambulating without difficulty.  Ability to Communicate : Normal communication ability  EyesConjunctivae and Eyelids : Conjunctivae and eyelids appear normal  Sclerae : White without injection  HENTHead :  Inspection : Normocephalic and atraumatic  Face :  Inspection : Face within normal limits  Ears :  External Ears : External ears within normal limits  Nose/Nasopharynx :  External Nose : External nose normal appearance, nares patent, no nasal discharge  Mouth and Throat :  General : Oral cavity appearance normal, breath odor normal  Lips : Appearance normal  NeckInspection and Palpation : Normal appearance without tenderness on palpation or deformities, trachea midline  Thyroid : Normal size without tenderness, nodules or masses  Jugular Veins : no JVD  ChestInspection of Chest : No lesions, deformities or traumatic injuries present. No significant scars are present.  Respiratory Effort : Breathing is unlabored without accessory muscle use  Palpation of Chest : Chest wall non-tender with no masses present. Tactile fremitus is normal  Auscultation : Normal breath sounds  CardiovascularHeart :  Auscultation : Heart rate is regular with normal rhythm. No murmurs are heard. No edema.  GastrointestinalAbdominal Exam : Abdomen soft without rigidity or guarding, abdomen non-tender to palpation, normal bowel sounds, no masses present, non-distended  Liver and Spleen : No hepatomegaly present. Liver is non-tender to palpation and spleen is not palpable.  LymphaticNeck : No lymphadenopathy present  Axillae : No lymphadenopathy present  Groin : No lymphadenopathy present  MusculoskeletalGait and Station : Normal Gait, normal station  Neck/Spine/Pelvis : No tenderness of deformities present.  SkinGeneral Inspection : No rashes, lesions or areas of discoloration present. Skin turgor is normal.  General Palpation : No abnormalities, masses or tenderness on palpation.  Neurologic and PsychiatricOrientation : Oriented to person, place and time  Sensation : Normal sensation  Memory : Short and long term memory intact.  Mood and Affect : Normal mood with an appropriate affect      Assessment  Crohn's Disease     555.2    Plan  OrdersPatient not identified as an unhealthy alcohol user when screene () - - 05/23/2019  Influenza immunization administered or previously received () - - 05/23/2019  BMI screening above normal () - - 05/23/2019  Current tobacco non-user (CAD, cap, COPD, PV) (dm) (IBD) (1036F, ) - - 05/23/2019  Colorectal cancer screening results documented and reviewed (PV) (3017F) - - 05/23/2019  Documentation of current medications. () - - 05/23/2019  InstructionsI have documented a list of current medications and reviewed it with the patient.  I encouraged the patient to diet and exercise.  CorrespondenceCC this document (Wayne Geiger Jr., MD) - 5/23/2019    Same labs as I ordered in 2018. She has started Lexapro, at  5mg, and is being increased to 10 mg a day.   has heart disease and is diabetic.  He has 3 stents.  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                                                                 Electronically Signed by: Curry Ludwig MD -Author on May 23, 2019 03:21:42 PM

## 2023-09-01 ENCOUNTER — OFFICE VISIT (OUTPATIENT)
Dept: URBAN - METROPOLITAN AREA TELEHEALTH 2 | Facility: TELEHEALTH | Age: 63
End: 2023-09-01
Payer: SELF-PAY

## 2023-09-01 DIAGNOSIS — K50.90 CROHN'S DISEASE: ICD-10-CM

## 2023-09-01 DIAGNOSIS — R19.7 DIARRHEA: ICD-10-CM

## 2023-09-01 PROCEDURE — 99214 OFFICE O/P EST MOD 30 MIN: CPT | Performed by: INTERNAL MEDICINE

## 2023-09-01 RX ORDER — ERYTHROMYCIN 20 MG/ML
1 APPLICATION SOLUTION TOPICAL TWICE A DAY
Status: ACTIVE | COMMUNITY

## 2023-09-01 RX ORDER — ESCITALOPRAM OXALATE 5 MG
TAKE 1 TABLET (5 MG) BY ORAL ROUTE ONCE DAILY TABLET ORAL 1
Qty: 0 | Refills: 0 | Status: ACTIVE | COMMUNITY
Start: 1900-01-01

## 2023-09-01 RX ORDER — PREDNISONE 20 MG/1
1 TABLET TABLET ORAL ONCE A DAY
Status: ACTIVE | COMMUNITY

## 2023-09-01 RX ORDER — ONDANSETRON HYDROCHLORIDE 4 MG/1
TAKE 2 TABLETS (8 MG) BY ORAL ROUTE DAILY TABLET, FILM COATED ORAL
Qty: 60 | Refills: 3 | Status: ACTIVE | COMMUNITY
Start: 1900-01-01

## 2023-09-01 RX ORDER — DOXYCYCLINE HYCLATE 100 MG/1
1 TABLET TABLET, COATED ORAL TWICE A DAY
Status: ACTIVE | COMMUNITY

## 2023-09-01 RX ORDER — VEDOLIZUMAB 300 MG/5ML
AS DIRECTED INJECTION, POWDER, LYOPHILIZED, FOR SOLUTION INTRAVENOUS
Qty: 300 | Status: ACTIVE | COMMUNITY
Start: 2023-07-05 | End: 2023-10-03

## 2023-09-01 RX ORDER — LISINOPRIL 5 MG/1
TAKE 1 TABLET (5 MG) BY ORAL ROUTE ONCE DAILY TABLET ORAL 1
Qty: 0 | Refills: 0 | Status: ACTIVE | COMMUNITY
Start: 1900-01-01

## 2023-09-01 NOTE — HPI-TODAY'S VISIT:
63 yo female with severe Crohn's disease was to get Entyvio and dental extraction, but got Covid and was hosptalized. Gave her Covid tx   Doing better and is still on prednisone Crohn's symptoms got worse.  Needed antibiotics for a dental infection  Was on 10 mg pred pre-hosp She is currently on 20 mg a day. Has no pain   Taper pred slowly from 20 mg to 15 to 10  Pantoprazole Levofloxacin 250 mg  for --- today last day.  Get covid ---=-  ====================== 8/3/23  Called her and discussed Entyvio and its risks which are favorable compared to especially prednisone and also  to other biologics.    She asked how she would feel  She asked about dental extraction.  Suggest the dental extraction be done in the next month, before she starts Entyvio, to simplify and eliminate any questions or uncertainty  Lengthy discussion.  I tried to reassure her -   25 minutes -- 2 phone calls as she could not do teleheatlth (5:30p-5:55p) [id number]    =========================== 7/5/23  63 yo female with Crohn's disease comes in for f/u.  Called recently for prednisone refill and I asked that she come in for a f2f  visit.  I encouraged and explained to her the need to take a biologic to get her off prednisone and she understood, but after getting the Humira at home, never started it. She says she is concerned about the side effects, though reassured that those are typically mild if any, and especially with Humira monotherapy. Also, I pointed out the severe metabolic and other side effects of prednisone. She takes prednisone 5-10 mg a day for years; On prednisone 5-10 mg a day, she has 1-2 stools a day without blood and only occastional pain and no ER visits.  But it has not healed her colon. When she tries to taper and stope the prednisone, her syx worsen with pain and diarrhea, rare blood.  Has ambetter insurance. She wants Entyvio due to its better safety.  That is fine but Humira was chosen before she had any insurance and infusion difficulty/expense to her weighed in the decision.  She now says she wants Entyvio. That is fine but surgical resection is another option for her.  Last colonoscopy 2/27/21 showed severe Crohn's colitis.  Due to rectal disease - moderate proctitis ---- a resection may be challenging, otherwise, a colectomy with ileo rectal anastomosis (ileum is nl) would be a good option.  She does not want to see Dr. Mckenna if she does not want Entyvio, Entyvio is ineffective, or if she just wants to proceed with a colectomy.  Might end up needing ileostomy but hopefuly not.   ===============================   10/12/22  80% counselling.  Reitterated her need for a biologic. Will work with Nurse aldair to arrange.  Also found left axillary lesion she pointed out that looks like hidradenitis rathery than typical node.   Pitting and soft under tissue.  Offered to do breast exam and she declined.   ============================ 4/10/22  I sent Betsy to Dr. Mg due to her elevated blood count. There was no abnormaily seen, other than that due to prednisone and her poorly treated crohn's.  Betsy now hesitates and asks me about her red eyes but they are not there now; it resolves over a week and could be iBD eye disease, e.g. episcleritis. I will direct her to [facility] but they don't take markr. Murdock may see her.  Also has some skin abnormalities and could benefit from Derm consult- will see who in Derm sees ambetter patient.   ============================== 12/22/21  61 yo female got an infected stye. She asks about Entyvio. A friend of a friend did well with Entyvio.  No h/o TB. Non smoker. I do not recommend Entyvio over Humira for her.    =========================== 6/28/21  61 yo female comes in for f/u to assess and discuss treatment options.  I have tried for years to get her on an alternative medication to prednisone.  Finally she accepted Humira but now has second thoughts.  She is concerned about its safety profile.  I said that side effects do occur, but most people tolerate it wel.  Hopefully it will work for her.  Hopefully she will have no or well tolerated side effects. Has been on prednisone for many years. 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 She has depression and is advised to go to a [facility] for eval and guidance. Maybe if the pred is tapered and hopefully but not certainly d/c, she will be better overall.  On prednisone 20 mg a day.  No abd pain. No diarrhea. No joint aches.  Encouraged her to learn it hersellf Offered to talk with her Humira ambassador but she said they don't do that.  ================================ 6/3/21  She has gotten Humira and a nurse was going to help her get the injection.  Jennifer sent alot of pens  Refrigerated. TB test negative.  Current syx: Diarrhea, not that much.  Want to know about the safety. Lots of side effects.  Currently on 15 to 20 mg prednisone Humira as a single agent is safe  Takes 3 BP meds ---- in part due to prednisone  Had to break away and   Humira is better for her than prednison  Has insurance.  Markr. =================== 5/23/19  Follow-up Crohn's Disease    History Of Present Illness  This is a scheduled appointment for this patient, a 58 year old /Black female, after a previous visit approximately Crohn's disease.     57 yo female with moderate to severe Crohn's disease comes in for 1 year f/u.  Currently on 10 mg pred a day.  In past year, has been up to 20 mg a day.  Offered to get Humira for her but needed pre-tx labs and she did not get them done.  Advised going to the health department.  AGWS.  Typically has 1-2 stools a day. No blood in stool. Sometime has abdominal pain.   ============= 56 yo female with Crohn's disease dx 1998, CD visits with me since 2004 or earlier.  comes in with daughter Júnior Lopez  Past poor response to 6 MP and has used chronic prednisone.  Discussed Humira.  Now we think Stelara might be the best shot.  Currently on prednisone 15 mg a day.  On HCTz'ahd  1 other.  BP ok controll.  Not diabetic.  No other medical problems.  Chronic back problems, lifting daughter.  Works at [employer] $8.75/hr      Past Medical History  Disease Name Date Onset Notes  Crohn's disease unk --   Depression unk --   Hypertension unk --       Past Surgical History  Procedure Name Date Notes  *No Past Surgical History unk 05/23/2019 - 03/09/2018 -       Medication List  Name Date Started Instructions  Lexapro 5 mg oral tablet  take 1 tablet (5 mg) by oral route once daily  lisinopril 5 mg oral tablet  take 1 tablet (5 mg) by oral route once daily  multivitamin oral  --   prednisone 5 mg oral tablet 02/17/2019 TAKE 3 TABLETS BY MOUTH ONCE DAILY  Zofran 4 mg oral tablet  take 2 tablets (8 mg) by oral route daily      Allergy List  Allergen Name Date Reaction Notes  NO KNOWN DRUG ALLERGIES --  --  --   No Known Environmental Allergies --  --  --   No Known Food Allergies --  --  --       Family Medical History  Disease Name Relative/Age Notes  *No Stated Family Medical History Father/ Mother/  no known history      Social History  Finding Status Start/Stop Quantity Notes  Alcohol Never --/-- --  05/23/2019 - 03/09/2018 -   Denies illicit substance abuse --  --/-- --  05/23/2019 -   Denies substance abuse --  --/-- --  05/23/2019 -   Tobacco Former --/-- --  05/23/2019 - 03/09/2018 - 11/03/2016 - 09/04/2015 -       Review of Systems  ConstitutionalDenies : body aches, chills, fatigue, fever, loss of appetite, malaise, night sweats, weight gain, weight loss  EyesDenies : blurred vision, visual changes  HENTDenies : Ear Pain/Ringing, hearing loss, Mouth Ulcers/Sores, nose bleeds, problems with gums/teeth, trouble swallowing  CardiovascularDenies : chest pain, leaky heart valves, heart murmur, heart racing/skipping, high blood pressure, palpitations  RespiratoryDenies : chronic cough, shortness of breath, wheezing or asthma symptoms  GastrointestinalDenies : Abdominal Pain/discomfort, Anal/Rectal Pain or Itching, Anal Spasm, Black Stool, Bloating/belching/gaseouness, Change of Bowel Habit, Constipation, Diarrhea/Loose Stool, Difficulty in Swallowing, Heartburn/esophageal reflux, Hemorrhoids, Indigestion, Mucus in Stool, Nausea/vomiting, Rectal Bleeding, Unintentional Weight Loss  GenitourinaryDenies : Blood in Urine, Burning/pain with Urination, frequency, Recent/frequent UTI, Kidney Stones  IntegumentDenies : itching/dry skin, jaundice, rashes, bumps or sores  NeurologicDenies : headaches, dizziness/vertigo, head trauma/injury, recent numbness/weakness, seizures  MusculoskeletalDenies : back pain, decreased range of motion, joint pain/arthritis, Problems Walking/calf or leg pain  EndocrineDenies : bruise easily, excessive thirst, heat/cold intolerance, history of high or low blood sugar  PsychiatricDenies : anxiety, changes in sleep pattern, depression, loss of memory  Heme-LymphDenies : Bleeding Problems, Enlarged Nodes/Swolen Glands, excessive bruising, history of anemia  Allergic-ImmunologicDenies : seasonal allergies    Vitals  Date Time BP Position Site L\R Cuff Size HR RR TEMP (F) WT  HT  BMI kg/m2 BSA m2 O2 Sat      05/23/2019 02:25 /104 Sitting    88 - R  97.9 135lbs 0oz 5'  3" 23.91 1.65        Physical Examination  ConstitutionalAppearance : Well nourished, well developed patient in no distress. Ambulating without difficulty.  Ability to Communicate : Normal communication ability  EyesConjunctivae and Eyelids : Conjunctivae and eyelids appear normal  Sclerae : White without injection  HENTHead :  Inspection : Normocephalic and atraumatic  Face :  Inspection : Face within normal limits  Ears :  External Ears : External ears within normal limits  Nose/Nasopharynx :  External Nose : External nose normal appearance, nares patent, no nasal discharge  Mouth and Throat :  General : Oral cavity appearance normal, breath odor normal  Lips : Appearance normal  NeckInspection and Palpation : Normal appearance without tenderness on palpation or deformities, trachea midline  Thyroid : Normal size without tenderness, nodules or masses  Jugular Veins : no JVD  ChestInspection of Chest : No lesions, deformities or traumatic injuries present. No significant scars are present.  Respiratory Effort : Breathing is unlabored without accessory muscle use  Palpation of Chest : Chest wall non-tender with no masses present. Tactile fremitus is normal  Auscultation : Normal breath sounds  CardiovascularHeart :  Auscultation : Heart rate is regular with normal rhythm. No murmurs are heard. No edema.  GastrointestinalAbdominal Exam : Abdomen soft without rigidity or guarding, abdomen non-tender to palpation, normal bowel sounds, no masses present, non-distended  Liver and Spleen : No hepatomegaly present. Liver is non-tender to palpation and spleen is not palpable.  LymphaticNeck : No lymphadenopathy present  Axillae : No lymphadenopathy present  Groin : No lymphadenopathy present  MusculoskeletalGait and Station : Normal Gait, normal station  Neck/Spine/Pelvis : No tenderness of deformities present.  SkinGeneral Inspection : No rashes, lesions or areas of discoloration present. Skin turgor is normal.  General Palpation : No abnormalities, masses or tenderness on palpation.  Neurologic and PsychiatricOrientation : Oriented to person, place and time  Sensation : Normal sensation  Memory : Short and long term memory intact.  Mood and Affect : Normal mood with an appropriate affect      Assessment  Crohn's Disease     555.2    Plan  OrdersPatient not identified as an unhealthy alcohol user when screene () - - 05/23/2019  Influenza immunization administered or previously received () - - 05/23/2019  BMI screening above normal () - - 05/23/2019  Current tobacco non-user (CAD, cap, COPD, PV) (dm) (IBD) (1036F, ) - - 05/23/2019  Colorectal cancer screening results documented and reviewed (PV) (3017F) - - 05/23/2019  Documentation of current medications. () - - 05/23/2019  InstructionsI have documented a list of current medications and reviewed it with the patient.  I encouraged the patient to diet and exercise.  CorrespondenceCC this document (Wayne Geiger Jr., MD) - 5/23/2019    Same labs as I ordered in 2018. She has started Lexapro, at  5mg, and is being increased to 10 mg a day.   has heart disease and is diabetic.  He has 3 stents.  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                                                                 Electronically Signed by: Curry Ludwig MD -Author on May 23, 2019 03:21:42 PM

## 2023-09-12 ENCOUNTER — TELEPHONE ENCOUNTER (OUTPATIENT)
Dept: URBAN - METROPOLITAN AREA CLINIC 98 | Facility: CLINIC | Age: 63
End: 2023-09-12

## 2023-09-12 RX ORDER — PREDNISONE 20 MG/1
1 TABLET TABLET ORAL TWICE DAILY
Qty: 60 TABLETS | Refills: 5

## 2024-02-07 ENCOUNTER — TELEP (OUTPATIENT)
Dept: URBAN - METROPOLITAN AREA TELEHEALTH 2 | Facility: TELEHEALTH | Age: 64
End: 2024-02-07

## 2024-02-07 VITALS — HEIGHT: 63 IN | BODY MASS INDEX: 21.97 KG/M2 | WEIGHT: 124 LBS

## 2024-02-07 DIAGNOSIS — K50.90 CROHN'S DISEASE: ICD-10-CM

## 2024-02-07 DIAGNOSIS — U07.1 ACUTE COVID-19: ICD-10-CM

## 2024-02-07 DIAGNOSIS — K62.5 RECTAL BLEEDING: ICD-10-CM

## 2024-02-07 DIAGNOSIS — H05.20 ACQUIRED EXOPHTHALMOS: ICD-10-CM

## 2024-02-07 DIAGNOSIS — R19.7 DIARRHEA, UNSPECIFIED TYPE: ICD-10-CM

## 2024-02-07 PROCEDURE — G2211 COMPLEX E/M VISIT ADD ON: HCPCS | Performed by: INTERNAL MEDICINE

## 2024-02-07 PROCEDURE — 99214 OFFICE O/P EST MOD 30 MIN: CPT | Performed by: INTERNAL MEDICINE

## 2024-02-07 RX ORDER — ESCITALOPRAM OXALATE 5 MG
TAKE 1 TABLET (5 MG) BY ORAL ROUTE ONCE DAILY TABLET ORAL 1
Qty: 0 | Refills: 0 | Status: ACTIVE | COMMUNITY
Start: 1900-01-01

## 2024-02-07 RX ORDER — VEDOLIZUMAB 300 MG/5ML
AS DIRECTED INJECTION, POWDER, LYOPHILIZED, FOR SOLUTION INTRAVENOUS
OUTPATIENT
Start: 2024-02-07

## 2024-02-07 RX ORDER — ONDANSETRON HYDROCHLORIDE 4 MG/1
TAKE 2 TABLETS (8 MG) BY ORAL ROUTE DAILY TABLET, FILM COATED ORAL
Qty: 60 | Refills: 3 | Status: ACTIVE | COMMUNITY
Start: 1900-01-01

## 2024-02-07 RX ORDER — LISINOPRIL 5 MG/1
TAKE 1 TABLET (5 MG) BY ORAL ROUTE ONCE DAILY TABLET ORAL 1
Qty: 0 | Refills: 0 | Status: ACTIVE | COMMUNITY
Start: 1900-01-01

## 2024-02-07 RX ORDER — ERYTHROMYCIN 20 MG/ML
1 APPLICATION SOLUTION TOPICAL TWICE A DAY
Status: ACTIVE | COMMUNITY

## 2024-02-07 RX ORDER — DOXYCYCLINE HYCLATE 100 MG/1
1 TABLET TABLET, COATED ORAL TWICE A DAY
Status: ACTIVE | COMMUNITY

## 2024-02-07 RX ORDER — PREDNISONE 20 MG/1
1 TABLET TABLET ORAL TWICE DAILY
Qty: 60 TABLETS | Refills: 5 | Status: ACTIVE | COMMUNITY

## 2024-02-07 NOTE — HPI-TODAY'S VISIT:
62 yo female with moderate to severe CD presents for  - could not connect by [employer] and so does by telephone. Recent covid that gave her diarrhea and now resolved but had covid in Aug 2023 as well. Disabled daughter goes to day care and nursing comes in daily as well. Was supposed to have started Entyvio in Sept, but delayed it due to expected dental work. She lost her insurance and just got it back in [place].  Has been on prednisone for her Crohn's disease for years and is just now willing to consider non-prednisone options.  Offered a surgical resection. Surgery is a better option than Entybio. She is ready to try Entyvio. Insurance is different and is through [employer].  Mild to moderate abdominal pain. Diarrhea -- 4 times a day. A litlle blood. Tired/fatigue - not sure  Given flagyl 500 bid by MD to help with diarrhea - for 10 days. 1:48--- ================================= 9/1/23  61 yo female with severe Crohn's disease was to get Entyvio and dental extraction, but got Covid and was hosptalized. Gave her Covid tx  Doing better and is still on prednisone Crohn's symptoms got worse.  Needed antibiotics for a dental infection  Was on 10 mg pred pre-hosp She is currently on 20 mg a day. Has no pain   Taper pred slowly from 20 mg to 15 to 10  Pantoprazole Levofloxacin 250 mg for --- today last day.  Get covid ---=-  ====================== 8/3/23  Called her and discussed Entyvio and its risks which are favorable compared to especially prednisone and also to other biologics.    She asked how she would feel  She asked about dental extraction.  Suggest the dental extraction be done in the next month, before she starts Entyvio, to simplify and eliminate any questions or uncertainty  Lengthy discussion.  I tried to reassure her -  25 minutes -- 2 phone calls as she could not do teleheatlth (5:30p-5:55p) [id number]    =========================== 7/5/23  61 yo female with Crohn's disease comes in for f/u.  Called recently for prednisone refill and I asked that she come in for a f2f visit.  I encouraged and explained to her the need to take a biologic to get her off prednisone and she understood, but after getting the Humira at home, never started it. She says she is concerned about the side effects, though reassured that those are typically mild if any, and especially with Humira monotherapy. Also, I pointed out the severe metabolic and other side effects of prednisone. She takes prednisone 5-10 mg a day for years; On prednisone 5-10 mg a day, she has 1-2 stools a day without blood and only occastional pain and no ER visits.  But it has not healed her colon. When she tries to taper and stope the prednisone, her syx worsen with pain and diarrhea, rare blood.  Has ambetter insurance. She wants Entyvio due to its better safety.  That is fine but Humira was chosen before she had any insurance and infusion difficulty/expense to her weighed in the decision.  She now says she wants Entyvio. That is fine but surgical resection is another option for her.  Last colonoscopy 2/27/21 showed severe Crohn's colitis.  Due to rectal disease - moderate proctitis ---- a resection may be challenging, otherwise, a colectomy with ileo rectal anastomosis (ileum is nl) would be a good option.  She does not want to see Dr. Mckenna if she does not want Entyvio, Entyvio is ineffective, or if she just wants to proceed with a colectomy. Might end up needing ileostomy but hopefuly not.   ===============================   10/12/22  80% counselling.  Reitterated her need for a biologic. Will work with Nurse aldair to arrange.  Also found left axillary lesion she pointed out that looks like hidradenitis rathery than typical node. Pitting and soft under tissue.  Offered to do breast exam and she declined.   ============================ 4/10/22  I sent Betsy to Dr. Mg due to her elevated blood count. There was no abnormaily seen, other than that due to prednisone and her poorly treated crohn's.  Betsy now hesitates and asks me about her red eyes but they are not there now; it resolves over a week and could be iBD eye disease, e.g. episcleritis. I will direct her to [facility] but they don't take ambetter. Poteet may see her.  Also has some skin abnormalities and could benefit from Derm consult- will see who in Derm sees ambetter patient.   ============================== 12/22/21  61 yo female got an infected stye. She asks about Entyvio. A friend of a friend did well with Entyvio.  No h/o TB. Non smoker. I do not recommend Entyvio over Humira for her.    =========================== 6/28/21  61 yo female comes in for f/u to assess and discuss treatment options.  I have tried for years to get her on an alternative medication to prednisone.  Finally she accepted Humira but now has second thoughts.  She is concerned about its safety profile.  I said that side effects do occur, but most people tolerate it wel.  Hopefully it will work for her.  Hopefully she will have no or well tolerated side effects. Has been on prednisone for many years. 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 She has depression and is advised to go to a [facility] for eval and guidance. Maybe if the pred is tapered and hopefully but not certainly d/c, she will be better overall.  On prednisone 20 mg a day.  No abd pain. No diarrhea. No joint aches.  Encouraged her to learn it hersellf Offered to talk with her Humira ambassador but she said they don't do that.  ================================ 6/3/21  She has gotten Humira and a nurse was going to help her get the injection.  Jennifer sent alot of pens  Refrigerated. TB test negative.  Current syx: Diarrhea, not that much.  Want to know about the safety. Lots of side effects.  Currently on 15 to 20 mg prednisone Humira as a single agent is safe  Takes 3 BP meds ---- in part due to prednisone  Had to break away and  Humira is better for her than prednison  Has insurance. Kamari. =================== 5/23/19  Follow-up Crohn's Disease   History Of Present Illness This is a scheduled appointment for this patient, a 58 year old /Black female, after a previous visit approximately Crohn's disease.   57 yo female with moderate to severe Crohn's disease comes in for 1 year f/u.  Currently on 10 mg pred a day.  In past year, has been up to 20 mg a day.  Offered to get Humira for her but needed pre-tx labs and she did not get them done.  Advised going to the health department.  AGWS.  Typically has 1-2 stools a day. No blood in stool. Sometime has abdominal pain.   ============= 58 yo female with Crohn's disease dx 1998, CD visits with me since 2004 or earlier.  comes in with daughter Júnior Lopez  Past poor response to 6 MP and has used chronic prednisone.  Discussed Humira.  Now we think Stelara might be the best shot.  Currently on prednisone 15 mg a day.  On HCTz'ahd 1 other.  BP ok controll.  Not diabetic.  No other medical problems.  Chronic back problems, lifting daughter.  Works at [employer] $8.75/hr     Past Medical History Disease Name Date Onset Notes Crohn's disease unk -- Depression unk -- Hypertension unk --     Past Surgical History Procedure Name Date Notes *No Past Surgical History unk 05/23/2019 - 03/09/2018 -     Medication List Name Date Started Instructions Lexapro 5 mg oral tablet take 1 tablet (5 mg) by oral route once daily lisinopril 5 mg oral tablet take 1 tablet (5 mg) by oral route once daily multivitamin oral -- prednisone 5 mg oral tablet 02/17/2019 TAKE 3 TABLETS BY MOUTH ONCE DAILY Zofran 4 mg oral tablet take 2 tablets (8 mg) by oral route daily     Allergy List Allergen Name Date Reaction Notes NO KNOWN DRUG ALLERGIES -- -- -- No Known Environmental Allergies -- -- -- No Known Food Allergies -- -- --     Family Medical History Disease Name Relative/Age Notes *No Stated Family Medical History Father/ Mother/ no known history     Social History Finding Status Start/Stop Quantity Notes Alcohol Never --/-- -- 05/23/2019 - 03/09/2018 - Denies illicit substance abuse -- --/-- -- 05/23/2019 - Denies substance abuse -- --/-- -- 05/23/2019 - Tobacco Former --/-- -- 05/23/2019 - 03/09/2018 - 11/03/2016 - 09/04/2015 -     Review of Systems ConstitutionalDenies : body aches, chills, fatigue, fever, loss of appetite, malaise, night sweats, weight gain, weight loss EyesDenies : blurred vision, visual changes HENTDenies : Ear Pain/Ringing, hearing loss, Mouth Ulcers/Sores, nose bleeds, problems with gums/teeth, trouble swallowing CardiovascularDenies : chest pain, leaky heart valves, heart murmur, heart racing/skipping, high blood pressure, palpitations RespiratoryDenies : chronic cough, shortness of breath, wheezing or asthma symptoms GastrointestinalDenies : Abdominal Pain/discomfort, Anal/Rectal Pain or Itching, Anal Spasm, Black Stool, Bloating/belching/gaseouness, Change of Bowel Habit, Constipation, Diarrhea/Loose Stool, Difficulty in Swallowing, Heartburn/esophageal reflux, Hemorrhoids, Indigestion, Mucus in Stool, Nausea/vomiting, Rectal Bleeding, Unintentional Weight Loss GenitourinaryDenies : Blood in Urine, Burning/pain with Urination, frequency, Recent/frequent UTI, Kidney Stones IntegumentDenies : itching/dry skin, jaundice, rashes, bumps or sores NeurologicDenies : headaches, dizziness/vertigo, head trauma/injury, recent numbness/weakness, seizures MusculoskeletalDenies : back pain, decreased range of motion, joint pain/arthritis, Problems Walking/calf or leg pain EndocrineDenies : bruise easily, excessive thirst, heat/cold intolerance, history of high or low blood sugar PsychiatricDenies : anxiety, changes in sleep pattern, depression, loss of memory Heme-LymphDenies : Bleeding Problems, Enlarged Nodes/Swolen Glands, excessive bruising, history of anemia Allergic-ImmunologicDenies : seasonal allergies   Vitals Date Time BP Position Site L\R Cuff Size HR RR TEMP (F) WT HT BMI kg/m2 BSA m2 O2 Sat HC  05/23/2019 02:25 /104 Sitting 88 - R 97.9 135lbs 0oz 5' 3" 23.91 1.65     Physical Examination ConstitutionalAppearance : Well nourished, well developed patient in no distress. Ambulating without difficulty. Ability to Communicate : Normal communication ability EyesConjunctivae and Eyelids : Conjunctivae and eyelids appear normal Sclerae : White without injection HENTHead : Inspection : Normocephalic and atraumatic Face : Inspection : Face within normal limits Ears : External Ears : External ears within normal limits Nose/Nasopharynx : External Nose : External nose normal appearance, nares patent, no nasal discharge Mouth and Throat : General : Oral cavity appearance normal, breath odor normal Lips : Appearance normal NeckInspection and Palpation : Normal appearance without tenderness on palpation or deformities, trachea midline Thyroid : Normal size without tenderness, nodules or masses Jugular Veins : no JVD ChestInspection of Chest : No lesions, deformities or traumatic injuries present. No significant scars are present. Respiratory Effort : Breathing is unlabored without accessory muscle use Palpation of Chest : Chest wall non-tender with no masses present. Tactile fremitus is normal Auscultation : Normal breath sounds CardiovascularHeart : Auscultation : Heart rate is regular with normal rhythm. No murmurs are heard. No edema. GastrointestinalAbdominal Exam : Abdomen soft without rigidity or guarding, abdomen non-tender to palpation, normal bowel sounds, no masses present, non-distended Liver and Spleen : No hepatomegaly present. Liver is non-tender to palpation and spleen is not palpable. LymphaticNeck : No lymphadenopathy present Axillae : No lymphadenopathy present Groin : No lymphadenopathy present MusculoskeletalGait and Station : Normal Gait, normal station Neck/Spine/Pelvis : No tenderness of deformities present. SkinGeneral Inspection : No rashes, lesions or areas of discoloration present. Skin turgor is normal. General Palpation : No abnormalities, masses or tenderness on palpation. Neurologic and PsychiatricOrientation : Oriented to person, place and time Sensation : Normal sensation Memory : Short and long term memory intact. Mood and Affect : Normal mood with an appropriate affect     Assessment Crohn's Disease 555.2    Plan OrdersPatient not identified as an unhealthy alcohol user when screene () - - 05/23/2019 Influenza immunization administered or previously received () - - 05/23/2019 BMI screening above normal () - - 05/23/2019 Current tobacco non-user (CAD, cap, COPD, PV) (dm) (IBD) (1036F, ) - - 05/23/2019 Colorectal cancer screening results documented and reviewed (PV) (3017F) - - 05/23/2019 Documentation of current medications. () - - 05/23/2019 InstructionsI have documented a list of current medications and reviewed it with the patient. I encouraged the patient to diet and exercise. CorrespondenceCC this document (Wayne Geiger Jr., MD) - 5/23/2019  Same labs as I ordered in 2018. She has started Lexapro, at 5mg, and is being increased to 10 mg a day.   has heart disease and is diabetic.  He has 3 stents.  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           Electronically Signed by: Curry Ludwig MD -Author on May 23, 2019 03:21:42 PM

## 2024-02-23 ENCOUNTER — LAB (OUTPATIENT)
Dept: URBAN - METROPOLITAN AREA MEDICAL CENTER 16 | Facility: MEDICAL CENTER | Age: 64
End: 2024-02-23

## 2024-02-23 LAB
% SATURATION: 7
A/G RATIO: 1
ABSOLUTE BASOPHILS: 54
ABSOLUTE EOSINOPHILS: 64
ABSOLUTE LYMPHOCYTES: 1231
ABSOLUTE MONOCYTES: 621
ABSOLUTE NEUTROPHILS: 8731
ALBUMIN: 3.4
ALKALINE PHOSPHATASE: 53
ALT (SGPT): 9
AST (SGOT): 18
BASOPHILS: 0.5
BILIRUBIN, TOTAL: 0.5
BUN/CREATININE RATIO: 28
BUN: 42
C-REACTIVE PROTEIN, QUANT: 82.3
CALCIUM: 8.9
CARBON DIOXIDE, TOTAL: 26
CHLORIDE: 97
CREATININE: 1.51
EGFR: 39
EOSINOPHILS: 0.6
FERRITIN: 73
FOLATE (FOLIC ACID), SERUM: 23.8
GLOBULIN, TOTAL: 3.3
GLUCOSE: 49
HEMATOCRIT: 31.3
HEMOGLOBIN: 9.7
HEPATITIS B SURFACE ANTIBODY QL: (no result)
HEPATITIS B SURFACE ANTIGEN: (no result)
IRON BINDING CAPACITY: 240
IRON, TOTAL: 17
LYMPHOCYTES: 11.5
MCH: 26.4
MCHC: 31
MCV: 85.3
MITOGEN-NIL: 1.94
MONOCYTES: 5.8
MPV: 9.6
NEUTROPHILS: 81.6
PLATELET COUNT: 312
POTASSIUM: 3.4
PROTEIN, TOTAL: 6.7
QUANTIFERON NIL VALUE: 0.01
QUANTIFERON TB1 AG VALUE: 0.01
QUANTIFERON TB2 AG VALUE: 0
QUANTIFERON-TB GOLD PLUS: NEGATIVE
RDW: 15.9
RED BLOOD CELL COUNT: 3.67
SED RATE BY MODIFIED: (no result)
SODIUM: 140
VITAMIN B12: 763
VITAMIN D,25-OH,TOTAL,IA: 23
WHITE BLOOD CELL COUNT: 10.7

## 2024-02-23 PROCEDURE — 99254 IP/OBS CNSLTJ NEW/EST MOD 60: CPT | Performed by: INTERNAL MEDICINE

## 2024-02-24 ENCOUNTER — LAB (OUTPATIENT)
Dept: URBAN - METROPOLITAN AREA MEDICAL CENTER 16 | Facility: MEDICAL CENTER | Age: 64
End: 2024-02-24

## 2024-02-24 PROCEDURE — 99233 SBSQ HOSP IP/OBS HIGH 50: CPT | Performed by: INTERNAL MEDICINE

## 2024-02-25 ENCOUNTER — LAB (OUTPATIENT)
Dept: URBAN - METROPOLITAN AREA MEDICAL CENTER 16 | Facility: MEDICAL CENTER | Age: 64
End: 2024-02-25

## 2024-02-25 PROCEDURE — 99233 SBSQ HOSP IP/OBS HIGH 50: CPT | Performed by: INTERNAL MEDICINE

## 2024-02-26 ENCOUNTER — LAB (OUTPATIENT)
Dept: URBAN - METROPOLITAN AREA MEDICAL CENTER 16 | Facility: MEDICAL CENTER | Age: 64
End: 2024-02-26

## 2024-02-26 PROCEDURE — 99232 SBSQ HOSP IP/OBS MODERATE 35: CPT | Performed by: INTERNAL MEDICINE

## 2024-03-02 ENCOUNTER — LAB (OUTPATIENT)
Dept: URBAN - METROPOLITAN AREA MEDICAL CENTER 16 | Facility: MEDICAL CENTER | Age: 64
End: 2024-03-02
Payer: COMMERCIAL

## 2024-03-02 DIAGNOSIS — K50.811 CROHN'S DISEASE OF BOTH SMALL AND LARGE INTESTINE WITH RECTAL BLEEDING: ICD-10-CM

## 2024-03-02 DIAGNOSIS — R19.7 ACUTE DIARRHEA: ICD-10-CM

## 2024-03-02 DIAGNOSIS — A04.72 C. DIFFICILE: ICD-10-CM

## 2024-03-02 PROCEDURE — 99232 SBSQ HOSP IP/OBS MODERATE 35: CPT | Performed by: INTERNAL MEDICINE

## 2024-03-03 ENCOUNTER — LAB (OUTPATIENT)
Dept: URBAN - METROPOLITAN AREA MEDICAL CENTER 16 | Facility: MEDICAL CENTER | Age: 64
End: 2024-03-03
Payer: COMMERCIAL

## 2024-03-03 DIAGNOSIS — K50.811 CROHN'S DISEASE OF BOTH SMALL AND LARGE INTESTINE WITH RECTAL BLEEDING: ICD-10-CM

## 2024-03-03 DIAGNOSIS — R19.7 ACUTE DIARRHEA: ICD-10-CM

## 2024-03-03 DIAGNOSIS — A04.72 C. DIFFICILE: ICD-10-CM

## 2024-03-03 PROCEDURE — 99232 SBSQ HOSP IP/OBS MODERATE 35: CPT | Performed by: INTERNAL MEDICINE

## 2024-03-20 ENCOUNTER — TELPHEP (OUTPATIENT)
Dept: URBAN - METROPOLITAN AREA TELEHEALTH 2 | Facility: TELEHEALTH | Age: 64
End: 2024-03-20
Payer: COMMERCIAL

## 2024-03-20 VITALS — BODY MASS INDEX: 21.97 KG/M2 | HEIGHT: 63 IN | WEIGHT: 124 LBS

## 2024-03-20 DIAGNOSIS — H05.20 ACQUIRED EXOPHTHALMOS: ICD-10-CM

## 2024-03-20 DIAGNOSIS — A04.72 C. DIFFICILE DIARRHEA: ICD-10-CM

## 2024-03-20 DIAGNOSIS — K50.90 CROHN'S DISEASE: ICD-10-CM

## 2024-03-20 PROBLEM — 5891000119102: Status: ACTIVE | Noted: 2024-03-20

## 2024-03-20 PROCEDURE — 99215 OFFICE O/P EST HI 40 MIN: CPT | Performed by: INTERNAL MEDICINE

## 2024-03-20 RX ORDER — ERYTHROMYCIN 20 MG/ML
1 APPLICATION SOLUTION TOPICAL TWICE A DAY
Status: ACTIVE | COMMUNITY

## 2024-03-20 RX ORDER — ESCITALOPRAM OXALATE 5 MG
TAKE 1 TABLET (5 MG) BY ORAL ROUTE ONCE DAILY TABLET ORAL 1
Qty: 0 | Refills: 0 | Status: ACTIVE | COMMUNITY
Start: 1900-01-01

## 2024-03-20 RX ORDER — ERGOCALCIFEROL CAPSULES, 1.25 MG/1
1 CAPSULE CAPSULE ORAL
Qty: 4 CAPSULES | Refills: 11 | Status: ACTIVE | COMMUNITY
Start: 2024-02-27 | End: 2025-02-21

## 2024-03-20 RX ORDER — FIDAXOMICIN 200 MG/1
1 TABLET TABLET, FILM COATED ORAL TWICE A DAY
Qty: 20 TABLETS | Refills: 1 | Status: ACTIVE | COMMUNITY
Start: 2024-03-20 | End: 2024-04-09

## 2024-03-20 RX ORDER — VANCOMYCIN HYDROCHLORIDE 250 MG/1
TAKE 1 CAPSULE CAPSULE ORAL
Qty: 56 CAPSULES | Refills: 1 | Status: ACTIVE | COMMUNITY
Start: 2024-03-20 | End: 2024-04-17

## 2024-03-20 RX ORDER — DOXYCYCLINE HYCLATE 100 MG/1
1 TABLET TABLET, COATED ORAL TWICE A DAY
Status: ACTIVE | COMMUNITY

## 2024-03-20 RX ORDER — LISINOPRIL 5 MG/1
TAKE 1 TABLET (5 MG) BY ORAL ROUTE ONCE DAILY TABLET ORAL 1
Qty: 0 | Refills: 0 | Status: ACTIVE | COMMUNITY
Start: 1900-01-01

## 2024-03-20 RX ORDER — FERROUS SULFATE TAB EC 325 MG (65 MG FE EQUIVALENT) 325 (65 FE) MG
1 TABLET TABLET DELAYED RESPONSE ORAL ONCE DAILY
Qty: 30 TABLETS | Refills: 11 | Status: ACTIVE | COMMUNITY
Start: 2024-02-27

## 2024-03-20 RX ORDER — ONDANSETRON HYDROCHLORIDE 4 MG/1
TAKE 2 TABLETS (8 MG) BY ORAL ROUTE DAILY TABLET, FILM COATED ORAL
Qty: 60 | Refills: 3 | Status: ACTIVE | COMMUNITY
Start: 1900-01-01

## 2024-03-20 RX ORDER — VEDOLIZUMAB 300 MG/5ML
AS DIRECTED INJECTION, POWDER, LYOPHILIZED, FOR SOLUTION INTRAVENOUS
Status: ACTIVE | COMMUNITY
Start: 2024-02-07

## 2024-03-20 RX ORDER — PREDNISONE 20 MG/1
1 TABLET TABLET ORAL TWICE DAILY
Qty: 60 TABLETS | Refills: 5 | Status: ACTIVE | COMMUNITY

## 2024-03-20 RX ORDER — VEDOLIZUMAB 300 MG/5ML
AS DIRECTED INJECTION, POWDER, LYOPHILIZED, FOR SOLUTION INTRAVENOUS
Qty: 300 EACH | Refills: 8 | Status: ACTIVE | COMMUNITY
Start: 2024-02-07 | End: 2025-07-15

## 2024-03-20 NOTE — HPI-TODAY'S VISIT:
64 yo female hosp twice in past 2 weeks. Has had bloody diarrhea. Dx. C diff and Crohn's. Was hosp at [facility] and then second hospt [facility].  She just completed vancomycin. for 10 days. Diarrhea is the same. stooling is 3 x a day. Prednisone 20 mg a day. No pain. 3 stools a day.  Will use Dificid or Vanco since starting biologic. Need to be sure that C diff toxin is negative.   ============= 2/7/24    64 yo female with moderate to severe CD presents for  - could not connect by Epuls and so does by telephone. Recent covid that gave her diarrhea and now resolved but had covid in Aug 2023 as well. Disabled daughter goes to day care and nursing comes in daily as well. Was supposed to have started Entyvio in Sept, but delayed it due to expected dental work. She lost her insurance and just got it back in [place].  Has been on prednisone for her Crohn's disease for years and is just now willing to consider non-prednisone options.  Offered a surgical resection. Surgery is a better option than Entybio. She is ready to try Entyvio. Insurance is different and is through [employer].  Mild to moderate abdominal pain. Diarrhea -- 4 times a day. A litlle blood. Tired/fatigue - not sure  Given flagyl 500 bid by MD to help with diarrhea - for 10 days. 1:48--- ================================= 9/1/23  61 yo female with severe Crohn's disease was to get Entyvio and dental extraction, but got Covid and was hosptalized. Gave her Covid tx  Doing better and is still on prednisone Crohn's symptoms got worse.  Needed antibiotics for a dental infection  Was on 10 mg pred pre-hosp She is currently on 20 mg a day. Has no pain   Taper pred slowly from 20 mg to 15 to 10  Pantoprazole Levofloxacin 250 mg for --- today last day.  Get covid ---=-  ====================== 8/3/23  Called her and discussed Entyvio and its risks which are favorable compared to especially prednisone and also to other biologics.    She asked how she would feel  She asked about dental extraction.  Suggest the dental extraction be done in the next month, before she starts Entyvio, to simplify and eliminate any questions or uncertainty  Lengthy discussion.  I tried to reassure her -  25 minutes -- 2 phone calls as she could not do teleheatlth (5:30p-5:55p) [id number]    =========================== 7/5/23  61 yo female with Crohn's disease comes in for f/u.  Called recently for prednisone refill and I asked that she come in for a f2f visit.  I encouraged and explained to her the need to take a biologic to get her off prednisone and she understood, but after getting the Humira at home, never started it. She says she is concerned about the side effects, though reassured that those are typically mild if any, and especially with Humira monotherapy. Also, I pointed out the severe metabolic and other side effects of prednisone. She takes prednisone 5-10 mg a day for years; On prednisone 5-10 mg a day, she has 1-2 stools a day without blood and only occastional pain and no ER visits.  But it has not healed her colon. When she tries to taper and stope the prednisone, her syx worsen with pain and diarrhea, rare blood.  Has ambetter insurance. She wants Entyvio due to its better safety.  That is fine but Humira was chosen before she had any insurance and infusion difficulty/expense to her weighed in the decision.  She now says she wants Entyvio. That is fine but surgical resection is another option for her.  Last colonoscopy 2/27/21 showed severe Crohn's colitis.  Due to rectal disease - moderate proctitis ---- a resection may be challenging, otherwise, a colectomy with ileo rectal anastomosis (ileum is nl) would be a good option.  She does not want to see Dr. Mckenna if she does not want Entyvio, Entyvio is ineffective, or if she just wants to proceed with a colectomy. Might end up needing ileostomy but hopefuly not.   ===============================   10/12/22  80% counselling.  Reitterated her need for a biologic. Will work with Nurse aldair to arrange.  Also found left axillary lesion she pointed out that looks like hidradenitis rathery than typical node. Pitting and soft under tissue.  Offered to do breast exam and she declined.   ============================ 4/10/22  I sent Betsy to Dr. Mg due to her elevated blood count. There was no abnormaily seen, other than that due to prednisone and her poorly treated crohn's.  Betsy now hesitates and asks me about her red eyes but they are not there now; it resolves over a week and could be iBD eye disease, e.g. episcleritis. I will direct her to [facility] but they don't take ambetter. [place] may see her.  Also has some skin abnormalities and could benefit from Derm consult- will see who in Derm sees bishopetter patient.   ============================== 12/22/21  59 yo female got an infected stye. She asks about Entyvio. A friend of a friend did well with Entyvio.  No h/o TB. Non smoker. I do not recommend Entyvio over Humira for her.    =========================== 6/28/21  59 yo female comes in for f/u to assess and discuss treatment options.  I have tried for years to get her on an alternative medication to prednisone.  Finally she accepted Humira but now has second thoughts.  She is concerned about its safety profile.  I said that side effects do occur, but most people tolerate it wel.  Hopefully it will work for her.  Hopefully she will have no or well tolerated side effects. Has been on prednisone for many years. 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 She has depression and is advised to go to a [facility] mental health center for eval and guidance. Maybe if the pred is tapered and hopefully but not certainly d/c, she will be better overall.  On prednisone 20 mg a day.  No abd pain. No diarrhea. No joint aches.  Encouraged her to learn it hersellf Offered to talk with her Humira ambassador but she said they don't do that.  ================================ 6/3/21  She has gotten Humira and a nurse was going to help her get the injection.  Jennifer sent alot of pens  Refrigerated. TB test negative.  Current syx: Diarrhea, not that much.  Want to know about the safety. Lots of side effects.  Currently on 15 to 20 mg prednisone Humira as a single agent is safe  Takes 3 BP meds ---- in part due to prednisone  Had to break away and  Humira is better for her than prednison  Has insurance. Ambetter. =================== 5/23/19  Follow-up Crohn's Disease   History Of Present Illness This is a scheduled appointment for this patient, a 58 year old /Black female, after a previous visit approximately Crohn's disease.   57 yo female with moderate to severe Crohn's disease comes in for 1 year f/u.  Currently on 10 mg pred a day.  In past year, has been up to 20 mg a day.  Offered to get Humira for her but needed pre-tx labs and she did not get them done.  Advised going to the health department.  AGWS.  Typically has 1-2 stools a day. No blood in stool. Sometime has abdominal pain.   ============= 58 yo female with Crohn's disease dx 1998, CD visits with me since 2004 or earlier.  comes in with daughter Júnior Lopez  Past poor response to 6 MP and has used chronic prednisone.  Discussed Humira.  Now we think Stelara might be the best shot.  Currently on prednisone 15 mg a day.  On HCTz'ahd 1 other.  BP ok controll.  Not diabetic.  No other medical problems.  Chronic back problems, lifting daughter.  Works at [employer] $8.75/hr     Past Medical History Disease Name Date Onset Notes Crohn's disease unk -- Depression unk -- Hypertension unk --     Past Surgical History Procedure Name Date Notes *No Past Surgical History unk 05/23/2019 - 03/09/2018 -     Medication List Name Date Started Instructions Lexapro 5 mg oral tablet take 1 tablet (5 mg) by oral route once daily lisinopril 5 mg oral tablet take 1 tablet (5 mg) by oral route once daily multivitamin oral -- prednisone 5 mg oral tablet 02/17/2019 TAKE 3 TABLETS BY MOUTH ONCE DAILY Zofran 4 mg oral tablet take 2 tablets (8 mg) by oral route daily     Allergy List Allergen Name Date Reaction Notes NO KNOWN DRUG ALLERGIES -- -- -- No Known Environmental Allergies -- -- -- No Known Food Allergies -- -- --     Family Medical History Disease Name Relative/Age Notes *No Stated Family Medical History Father/ Mother/ no known history     Social History Finding Status Start/Stop Quantity Notes Alcohol Never --/-- -- 05/23/2019 - 03/09/2018 - Denies illicit substance abuse -- --/-- -- 05/23/2019 - Denies substance abuse -- --/-- -- 05/23/2019 - Tobacco Former --/-- -- 05/23/2019 - 03/09/2018 - 11/03/2016 - 09/04/2015 -     Review of Systems ConstitutionalDenies : body aches, chills, fatigue, fever, loss of appetite, malaise, night sweats, weight gain, weight loss EyesDenies : blurred vision, visual changes HENTDenies : Ear Pain/Ringing, hearing loss, Mouth Ulcers/Sores, nose bleeds, problems with gums/teeth, trouble swallowing CardiovascularDenies : chest pain, leaky heart valves, heart murmur, heart racing/skipping, high blood pressure, palpitations RespiratoryDenies : chronic cough, shortness of breath, wheezing or asthma symptoms GastrointestinalDenies : Abdominal Pain/discomfort, Anal/Rectal Pain or Itching, Anal Spasm, Black Stool, Bloating/belching/gaseouness, Change of Bowel Habit, Constipation, Diarrhea/Loose Stool, Difficulty in Swallowing, Heartburn/esophageal reflux, Hemorrhoids, Indigestion, Mucus in Stool, Nausea/vomiting, Rectal Bleeding, Unintentional Weight Loss GenitourinaryDenies : Blood in Urine, Burning/pain with Urination, frequency, Recent/frequent UTI, Kidney Stones IntegumentDenies : itching/dry skin, jaundice, rashes, bumps or sores NeurologicDenies : headaches, dizziness/vertigo, head trauma/injury, recent numbness/weakness, seizures MusculoskeletalDenies : back pain, decreased range of motion, joint pain/arthritis, Problems Walking/calf or leg pain EndocrineDenies : bruise easily, excessive thirst, heat/cold intolerance, history of high or low blood sugar PsychiatricDenies : anxiety, changes in sleep pattern, depression, loss of memory Heme-LymphDenies : Bleeding Problems, Enlarged Nodes/Swolen Glands, excessive bruising, history of anemia Allergic-ImmunologicDenies : seasonal allergies   Vitals Date Time BP Position Site L\R Cuff Size HR RR TEMP (F) WT HT BMI kg/m2 BSA m2 O2 Sat HC  05/23/2019 02:25 /104 Sitting 88 - R 97.9 135lbs 0oz 5' 3" 23.91 1.65     Physical Examination ConstitutionalAppearance : Well nourished, well developed patient in no distress. Ambulating without difficulty. Ability to Communicate : Normal communication ability EyesConjunctivae and Eyelids : Conjunctivae and eyelids appear normal Sclerae : White without injection HENTHead : Inspection : Normocephalic and atraumatic Face : Inspection : Face within normal limits Ears : External Ears : External ears within normal limits Nose/Nasopharynx : External Nose : External nose normal appearance, nares patent, no nasal discharge Mouth and Throat : General : Oral cavity appearance normal, breath odor normal Lips : Appearance normal NeckInspection and Palpation : Normal appearance without tenderness on palpation or deformities, trachea midline Thyroid : Normal size without tenderness, nodules or masses Jugular Veins : no JVD ChestInspection of Chest : No lesions, deformities or traumatic injuries present. No significant scars are present. Respiratory Effort : Breathing is unlabored without accessory muscle use Palpation of Chest : Chest wall non-tender with no masses present. Tactile fremitus is normal Auscultation : Normal breath sounds CardiovascularHeart : Auscultation : Heart rate is regular with normal rhythm. No murmurs are heard. No edema. GastrointestinalAbdominal Exam : Abdomen soft without rigidity or guarding, abdomen non-tender to palpation, normal bowel sounds, no masses present, non-distended Liver and Spleen : No hepatomegaly present. Liver is non-tender to palpation and spleen is not palpable. LymphaticNeck : No lymphadenopathy present Axillae : No lymphadenopathy present Groin : No lymphadenopathy present MusculoskeletalGait and Station : Normal Gait, normal station Neck/Spine/Pelvis : No tenderness of deformities present. SkinGeneral Inspection : No rashes, lesions or areas of discoloration present. Skin turgor is normal. General Palpation : No abnormalities, masses or tenderness on palpation. Neurologic and PsychiatricOrientation : Oriented to person, place and time Sensation : Normal sensation Memory : Short and long term memory intact. Mood and Affect : Normal mood with an appropriate affect     Assessment Crohn's Disease 555.2    Plan OrdersPatient not identified as an unhealthy alcohol user when screene () - - 05/23/2019 Influenza immunization administered or previously received () - - 05/23/2019 BMI screening above normal () - - 05/23/2019 Current tobacco non-user (CAD, cap, COPD, PV) (dm) (IBD) (1036F, ) - - 05/23/2019 Colorectal cancer screening results documented and reviewed (PV) (3017F) - - 05/23/2019 Documentation of current medications. () - - 05/23/2019 InstructionsI have documented a list of current medications and reviewed it with the patient. I encouraged the patient to diet and exercise. CorrespondenceCC this document (Wayne Geiger Jr., MD) - 5/23/2019  Same labs as I ordered in 2018. She has started Lexapro, at 5mg, and is being increased to 10 mg a day.   has heart disease and is diabetic.  He has 3 stents.  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           Electronically Signed by: Curry Ludwig MD -Author on May 23, 2019 03:21:42 PM

## 2024-03-22 LAB
A/G RATIO: 0.9
ABSOLUTE BASOPHILS: 16
ABSOLUTE EOSINOPHILS: 0
ABSOLUTE LYMPHOCYTES: 312
ABSOLUTE MONOCYTES: 265
ABSOLUTE NEUTROPHILS: (no result)
ALBUMIN: 2.9
ALKALINE PHOSPHATASE: 76
ALT (SGPT): 11
AST (SGOT): 13
BASOPHILS: 0.1
BILIRUBIN, TOTAL: 0.5
BUN/CREATININE RATIO: 27
BUN: 29
C-REACTIVE PROTEIN, QUANT: 74.2
CALCIUM: 8.6
CARBON DIOXIDE, TOTAL: 22
CHLORIDE: 103
CREATININE: 1.06
EGFR: 59
EOSINOPHILS: 0
GLOBULIN, TOTAL: 3.1
GLUCOSE: 58
HEMATOCRIT: 27.4
HEMOGLOBIN: 8.8
LYMPHOCYTES: 2
MCH: 29.2
MCHC: 32.1
MCV: 91
MONOCYTES: 1.7
MPV: 9.2
NEUTROPHILS: 96.2
PLATELET COUNT: 433
POTASSIUM: 4.8
PROTEIN, TOTAL: 6
RDW: 17.3
RED BLOOD CELL COUNT: 3.01
SED RATE BY MODIFIED: 31
SODIUM: 140
WHITE BLOOD CELL COUNT: 15.6

## 2024-04-04 ENCOUNTER — LAB (OUTPATIENT)
Dept: URBAN - METROPOLITAN AREA MEDICAL CENTER 39 | Facility: MEDICAL CENTER | Age: 64
End: 2024-04-04
Payer: COMMERCIAL

## 2024-04-04 DIAGNOSIS — K50.80 CROHN'S DISEASE OF BOTH SMALL AND LARGE INTESTINE WITHOUT COMPLICATIONS: ICD-10-CM

## 2024-04-04 DIAGNOSIS — D64.89 NORMOCYTIC ANEMIA: ICD-10-CM

## 2024-04-04 PROCEDURE — 99223 1ST HOSP IP/OBS HIGH 75: CPT | Performed by: STUDENT IN AN ORGANIZED HEALTH CARE EDUCATION/TRAINING PROGRAM

## 2024-04-04 PROCEDURE — 99255 IP/OBS CONSLTJ NEW/EST HI 80: CPT | Performed by: STUDENT IN AN ORGANIZED HEALTH CARE EDUCATION/TRAINING PROGRAM

## 2024-04-04 PROCEDURE — G8427 DOCREV CUR MEDS BY ELIG CLIN: HCPCS | Performed by: STUDENT IN AN ORGANIZED HEALTH CARE EDUCATION/TRAINING PROGRAM

## 2024-04-05 ENCOUNTER — LAB (OUTPATIENT)
Dept: URBAN - METROPOLITAN AREA MEDICAL CENTER 39 | Facility: MEDICAL CENTER | Age: 64
End: 2024-04-05
Payer: COMMERCIAL

## 2024-04-05 DIAGNOSIS — K50.90 CROHN'S DISEASE, UNSPECIFIED, WITHOUT COMPLICATIONS: ICD-10-CM

## 2024-04-05 DIAGNOSIS — D64.89 NORMOCYTIC ANEMIA: ICD-10-CM

## 2024-04-05 PROCEDURE — 99232 SBSQ HOSP IP/OBS MODERATE 35: CPT | Performed by: STUDENT IN AN ORGANIZED HEALTH CARE EDUCATION/TRAINING PROGRAM

## 2024-04-08 ENCOUNTER — LAB (OUTPATIENT)
Dept: URBAN - METROPOLITAN AREA MEDICAL CENTER 39 | Facility: MEDICAL CENTER | Age: 64
End: 2024-04-08
Payer: COMMERCIAL

## 2024-04-08 DIAGNOSIS — K50.80 CROHN'S DISEASE OF BOTH SMALL AND LARGE INTESTINE: ICD-10-CM

## 2024-04-08 DIAGNOSIS — D64.89 NORMOCYTIC ANEMIA: ICD-10-CM

## 2024-04-08 PROCEDURE — 99232 SBSQ HOSP IP/OBS MODERATE 35: CPT | Performed by: STUDENT IN AN ORGANIZED HEALTH CARE EDUCATION/TRAINING PROGRAM

## 2024-04-09 ENCOUNTER — LAB (OUTPATIENT)
Dept: URBAN - METROPOLITAN AREA MEDICAL CENTER 39 | Facility: MEDICAL CENTER | Age: 64
End: 2024-04-09
Payer: COMMERCIAL

## 2024-04-09 DIAGNOSIS — K92.1 HEMATOCHEZIA: ICD-10-CM

## 2024-04-09 DIAGNOSIS — K50.80 CROHN'S DISEASE OF BOTH SMALL AND LARGE INTESTINE: ICD-10-CM

## 2024-04-09 DIAGNOSIS — D64.89 NORMOCYTIC ANEMIA: ICD-10-CM

## 2024-04-09 PROCEDURE — 99232 SBSQ HOSP IP/OBS MODERATE 35: CPT | Performed by: STUDENT IN AN ORGANIZED HEALTH CARE EDUCATION/TRAINING PROGRAM

## 2024-04-10 ENCOUNTER — LAB (OUTPATIENT)
Dept: URBAN - METROPOLITAN AREA MEDICAL CENTER 39 | Facility: MEDICAL CENTER | Age: 64
End: 2024-04-10
Payer: COMMERCIAL

## 2024-04-10 DIAGNOSIS — K50.80 CROHN'S DISEASE OF BOTH SMALL AND LARGE INTESTINE: ICD-10-CM

## 2024-04-10 DIAGNOSIS — K92.1 HEMATOCHEZIA: ICD-10-CM

## 2024-04-10 DIAGNOSIS — D64.89 NORMOCYTIC ANEMIA: ICD-10-CM

## 2024-04-10 PROCEDURE — 99232 SBSQ HOSP IP/OBS MODERATE 35: CPT | Performed by: STUDENT IN AN ORGANIZED HEALTH CARE EDUCATION/TRAINING PROGRAM

## 2024-04-11 ENCOUNTER — LAB (OUTPATIENT)
Dept: URBAN - METROPOLITAN AREA MEDICAL CENTER 39 | Facility: MEDICAL CENTER | Age: 64
End: 2024-04-11
Payer: COMMERCIAL

## 2024-04-11 DIAGNOSIS — K50.911 ACUTE CROHN'S DISEASE WITH RECTAL BLEEDING: ICD-10-CM

## 2024-04-11 DIAGNOSIS — D64.89 ANEMIA DUE TO OTHER CAUSE, NOT CLASSIFIED: ICD-10-CM

## 2024-04-11 DIAGNOSIS — R10.84 GENERALIZED ABDOMINAL PAIN: ICD-10-CM

## 2024-04-11 DIAGNOSIS — K50.918 CROHN'S DISEASE, UNSPECIFIED, WITH OTHER COMPLICATION: ICD-10-CM

## 2024-04-11 DIAGNOSIS — R19.7 DIARRHEA, UNSPECIFIED TYPE: ICD-10-CM

## 2024-04-11 PROCEDURE — 99232 SBSQ HOSP IP/OBS MODERATE 35: CPT | Performed by: STUDENT IN AN ORGANIZED HEALTH CARE EDUCATION/TRAINING PROGRAM

## 2024-04-15 ENCOUNTER — TELEP (OUTPATIENT)
Dept: URBAN - METROPOLITAN AREA TELEHEALTH 2 | Facility: TELEHEALTH | Age: 64
End: 2024-04-15

## 2024-04-15 VITALS — HEIGHT: 63 IN | WEIGHT: 124 LBS | BODY MASS INDEX: 21.97 KG/M2

## 2024-04-15 DIAGNOSIS — H05.20 ACQUIRED EXOPHTHALMOS: ICD-10-CM

## 2024-04-15 DIAGNOSIS — K50.80 CROHN'S COLITIS: ICD-10-CM

## 2024-04-15 DIAGNOSIS — A04.72 C. DIFFICILE DIARRHEA: ICD-10-CM

## 2024-04-15 RX ORDER — PREDNISONE 20 MG/1
1 TABLET TABLET ORAL TWICE DAILY
Qty: 60 TABLETS | Refills: 5 | Status: ACTIVE | COMMUNITY

## 2024-04-15 RX ORDER — ONDANSETRON HYDROCHLORIDE 4 MG/1
TAKE 2 TABLETS (8 MG) BY ORAL ROUTE DAILY TABLET, FILM COATED ORAL
Qty: 60 | Refills: 3 | Status: ACTIVE | COMMUNITY
Start: 1900-01-01

## 2024-04-15 RX ORDER — VANCOMYCIN HYDROCHLORIDE 250 MG/1
TAKE 1 CAPSULE CAPSULE ORAL
Qty: 56 CAPSULES | Refills: 1 | Status: ACTIVE | COMMUNITY
Start: 2024-03-20 | End: 2024-04-17

## 2024-04-15 RX ORDER — ERGOCALCIFEROL CAPSULES, 1.25 MG/1
1 CAPSULE CAPSULE ORAL
Qty: 4 CAPSULES | Refills: 11 | Status: ACTIVE | COMMUNITY
Start: 2024-02-27 | End: 2025-02-21

## 2024-04-15 RX ORDER — DOXYCYCLINE HYCLATE 100 MG/1
1 TABLET TABLET, COATED ORAL TWICE A DAY
Status: ACTIVE | COMMUNITY

## 2024-04-15 RX ORDER — ERYTHROMYCIN 20 MG/ML
1 APPLICATION SOLUTION TOPICAL TWICE A DAY
Status: ACTIVE | COMMUNITY

## 2024-04-15 RX ORDER — ESCITALOPRAM OXALATE 5 MG
TAKE 1 TABLET (5 MG) BY ORAL ROUTE ONCE DAILY TABLET ORAL 1
Qty: 0 | Refills: 0 | Status: ACTIVE | COMMUNITY
Start: 1900-01-01

## 2024-04-15 RX ORDER — FERROUS SULFATE TAB EC 325 MG (65 MG FE EQUIVALENT) 325 (65 FE) MG
1 TABLET TABLET DELAYED RESPONSE ORAL ONCE DAILY
Qty: 30 TABLETS | Refills: 11 | Status: ACTIVE | COMMUNITY
Start: 2024-02-27

## 2024-04-15 RX ORDER — VEDOLIZUMAB 300 MG/5ML
AS DIRECTED INJECTION, POWDER, LYOPHILIZED, FOR SOLUTION INTRAVENOUS
Qty: 300 EACH | Refills: 8 | Status: ACTIVE | COMMUNITY
Start: 2024-02-07 | End: 2025-07-15

## 2024-04-15 RX ORDER — LISINOPRIL 5 MG/1
TAKE 1 TABLET (5 MG) BY ORAL ROUTE ONCE DAILY TABLET ORAL 1
Qty: 0 | Refills: 0 | Status: ACTIVE | COMMUNITY
Start: 1900-01-01

## 2024-04-15 RX ORDER — VEDOLIZUMAB 300 MG/5ML
AS DIRECTED INJECTION, POWDER, LYOPHILIZED, FOR SOLUTION INTRAVENOUS
Status: ACTIVE | COMMUNITY
Start: 2024-02-07

## 2024-04-15 NOTE — HPI-TODAY'S VISIT:
64 yo female recently hospitalized 3 weeks. Has not started Entyvio yet. Needs to get Entyvio started.  She is still on Entyvio. I want to keep her on Vancomycin while we do Vancomycin induction.  Doctor at [facility] was setting her up for rehab.  She says she was retested for C diff and it was clear. I want her stay on Vancomyin through June.  Admitted to [facility] - Room 622.  Will get records and review.    ========================== 3/20/24  64 yo female hosp twice in past 2 weeks. Has had bloody diarrhea. Dx. C diff and Crohn's. Was hosp at [facility] and then second hospt [facility].  She just completed vancomycin. for 10 days. Diarrhea is the same. stooling is 3 x a day. Prednisone 20 mg a day. No pain. 3 stools a day.  Will use Dificid or Vanco since starting biologic. Need to be sure that C diff toxin is negative.   ============= 2/7/24    64 yo female with moderate to severe CD presents for  - could not connect by [facility] and so does by telephone. Recent covid that gave her diarrhea and now resolved but had covid in Aug 2023 as well. Disabled daughter goes to day care and nursing comes in daily as well. Was supposed to have started Entyvio in Sept, but delayed it due to expected dental work. She lost her insurance and just got it back in [place].  Has been on prednisone for her Crohn's disease for years and is just now willing to consider non-prednisone options.  Offered a surgical resection. Surgery is a better option than Entybio. She is ready to try Entyvio. Insurance is different and is through [employer].  Mild to moderate abdominal pain. Diarrhea -- 4 times a day. A litlle blood. Tired/fatigue - not sure  Given flagyl 500 bid by MD to help with diarrhea - for 10 days. 1:48--- ================================= 9/1/23  63 yo female with severe Crohn's disease was to get Entyvio and dental extraction, but got Covid and was hosptalized. Gave her Covid tx  Doing better and is still on prednisone Crohn's symptoms got worse.  Needed antibiotics for a dental infection  Was on 10 mg pred pre-hosp She is currently on 20 mg a day. Has no pain   Taper pred slowly from 20 mg to 15 to 10  Pantoprazole Levofloxacin 250 mg for --- today last day.  Get covid ---=-  ====================== 8/3/23  Called her and discussed Entyvio and its risks which are favorable compared to especially prednisone and also to other biologics.    She asked how she would feel  She asked about dental extraction.  Suggest the dental extraction be done in the next month, before she starts Entyvio, to simplify and eliminate any questions or uncertainty  Lengthy discussion.  I tried to reassure her -  25 minutes -- 2 phone calls as she could not do teleheatlth (5:30p-5:55p) [id number]    =========================== 7/5/23  63 yo female with Crohn's disease comes in for f/u.  Called recently for prednisone refill and I asked that she come in for a f2f visit.  I encouraged and explained to her the need to take a biologic to get her off prednisone and she understood, but after getting the Humira at home, never started it. She says she is concerned about the side effects, though reassured that those are typically mild if any, and especially with Humira monotherapy. Also, I pointed out the severe metabolic and other side effects of prednisone. She takes prednisone 5-10 mg a day for years; On prednisone 5-10 mg a day, she has 1-2 stools a day without blood and only occastional pain and no ER visits.  But it has not healed her colon. When she tries to taper and stope the prednisone, her syx worsen with pain and diarrhea, rare blood.  Has ambetter insurance. She wants Entyvio due to its better safety.  That is fine but Humira was chosen before she had any insurance and infusion difficulty/expense to her weighed in the decision.  She now says she wants Entyvio. That is fine but surgical resection is another option for her.  Last colonoscopy 2/27/21 showed severe Crohn's colitis.  Due to rectal disease - moderate proctitis ---- a resection may be challenging, otherwise, a colectomy with ileo rectal anastomosis (ileum is nl) would be a good option.  She does not want to see Dr. Mckenna if she does not want Entyvio, Entyvio is ineffective, or if she just wants to proceed with a colectomy. Might end up needing ileostomy but hopefuly not.   ===============================   10/12/22  80% counselling.  Reitterated her need for a biologic. Will work with Nurse aldair to arrange.  Also found left axillary lesion she pointed out that looks like hidradenitis rathery than typical node. Pitting and soft under tissue.  Offered to do breast exam and she declined.   ============================ 4/10/22  I sent Betsy to Dr. Mg due to her elevated blood count. There was no abnormaily seen, other than that due to prednisone and her poorly treated crohn's.  Betsy now hesitates and asks me about her red eyes but they are not there now; it resolves over a week and could be iBD eye disease, e.g. episcleritis. I will direct her to [facility] but they don't take ambkwakur. [place] may see her.  Also has some skin abnormalities and could benefit from Derm consult- will see who in Derm sees ambetter patient.   ============================== 12/22/21  59 yo female got an infected stye. She asks about Entyvio. A friend of a friend did well with Entyvio.  No h/o TB. Non smoker. I do not recommend Entyvio over Humira for her.    =========================== 6/28/21  59 yo female comes in for f/u to assess and discuss treatment options.  I have tried for years to get her on an alternative medication to prednisone.  Finally she accepted Humira but now has second thoughts.  She is concerned about its safety profile.  I said that side effects do occur, but most people tolerate it wel.  Hopefully it will work for her.  Hopefully she will have no or well tolerated side effects. Has been on prednisone for many years. 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 She has depression and is advised to go to a [facility] mental health center for eval and guidance. Maybe if the pred is tapered and hopefully but not certainly d/c, she will be better overall.  On prednisone 20 mg a day.  No abd pain. No diarrhea. No joint aches.  Encouraged her to learn it hersellf Offered to talk with her Humira ambassador but she said they don't do that.  ================================ 6/3/21  She has gotten Humira and a nurse was going to help her get the injection.  Jennifer sent alot of pens  Refrigerated. TB test negative.  Current syx: Diarrhea, not that much.  Want to know about the safety. Lots of side effects.  Currently on 15 to 20 mg prednisone Humira as a single agent is safe  Takes 3 BP meds ---- in part due to prednisone  Had to break away and  Humira is better for her than prednison  Has insurance. Ambetter. =================== 5/23/19  Follow-up Crohn's Disease   History Of Present Illness This is a scheduled appointment for this patient, a 58 year old /Black female, after a previous visit approximately Crohn's disease.   57 yo female with moderate to severe Crohn's disease comes in for 1 year f/u.  Currently on 10 mg pred a day.  In past year, has been up to 20 mg a day.  Offered to get Humira for her but needed pre-tx labs and she did not get them done.  Advised going to the health department.  AGWS.  Typically has 1-2 stools a day. No blood in stool. Sometime has abdominal pain.   ============= 56 yo female with Crohn's disease dx 1998, CD visits with me since 2004 or earlier.  comes in with daughter Júnior Lopez  Past poor response to 6 MP and has used chronic prednisone.  Discussed Humira.  Now we think Stelara might be the best shot.  Currently on prednisone 15 mg a day.  On HCTz'ahd 1 other.  BP ok controll.  Not diabetic.  No other medical problems.  Chronic back problems, lifting daughter.  Works at [employer] $8.75/hr     Past Medical History Disease Name Date Onset Notes Crohn's disease unk -- Depression unk -- Hypertension unk --     Past Surgical History Procedure Name Date Notes *No Past Surgical History unk 05/23/2019 - 03/09/2018 -     Medication List Name Date Started Instructions Lexapro 5 mg oral tablet take 1 tablet (5 mg) by oral route once daily lisinopril 5 mg oral tablet take 1 tablet (5 mg) by oral route once daily multivitamin oral -- prednisone 5 mg oral tablet 02/17/2019 TAKE 3 TABLETS BY MOUTH ONCE DAILY Zofran 4 mg oral tablet take 2 tablets (8 mg) by oral route daily     Allergy List Allergen Name Date Reaction Notes NO KNOWN DRUG ALLERGIES -- -- -- No Known Environmental Allergies -- -- -- No Known Food Allergies -- -- --     Family Medical History Disease Name Relative/Age Notes *No Stated Family Medical History Father/ Mother/ no known history     Social History Finding Status Start/Stop Quantity Notes Alcohol Never --/-- -- 05/23/2019 - 03/09/2018 - Denies illicit substance abuse -- --/-- -- 05/23/2019 - Denies substance abuse -- --/-- -- 05/23/2019 - Tobacco Former --/-- -- 05/23/2019 - 03/09/2018 - 11/03/2016 - 09/04/2015 -     Review of Systems ConstitutionalDenies : body aches, chills, fatigue, fever, loss of appetite, malaise, night sweats, weight gain, weight loss EyesDenies : blurred vision, visual changes HENTDenies : Ear Pain/Ringing, hearing loss, Mouth Ulcers/Sores, nose bleeds, problems with gums/teeth, trouble swallowing CardiovascularDenies : chest pain, leaky heart valves, heart murmur, heart racing/skipping, high blood pressure, palpitations RespiratoryDenies : chronic cough, shortness of breath, wheezing or asthma symptoms GastrointestinalDenies : Abdominal Pain/discomfort, Anal/Rectal Pain or Itching, Anal Spasm, Black Stool, Bloating/belching/gaseouness, Change of Bowel Habit, Constipation, Diarrhea/Loose Stool, Difficulty in Swallowing, Heartburn/esophageal reflux, Hemorrhoids, Indigestion, Mucus in Stool, Nausea/vomiting, Rectal Bleeding, Unintentional Weight Loss GenitourinaryDenies : Blood in Urine, Burning/pain with Urination, frequency, Recent/frequent UTI, Kidney Stones IntegumentDenies : itching/dry skin, jaundice, rashes, bumps or sores NeurologicDenies : headaches, dizziness/vertigo, head trauma/injury, recent numbness/weakness, seizures MusculoskeletalDenies : back pain, decreased range of motion, joint pain/arthritis, Problems Walking/calf or leg pain EndocrineDenies : bruise easily, excessive thirst, heat/cold intolerance, history of high or low blood sugar PsychiatricDenies : anxiety, changes in sleep pattern, depression, loss of memory Heme-LymphDenies : Bleeding Problems, Enlarged Nodes/Swolen Glands, excessive bruising, history of anemia Allergic-ImmunologicDenies : seasonal allergies   Vitals Date Time BP Position Site L\R Cuff Size HR RR TEMP (F) WT HT BMI kg/m2 BSA m2 O2 Sat   05/23/2019 02:25 /104 Sitting 88 - R 97.9 135lbs 0oz 5' 3" 23.91 1.65     Physical Examination ConstitutionalAppearance : Well nourished, well developed patient in no distress. Ambulating without difficulty. Ability to Communicate : Normal communication ability EyesConjunctivae and Eyelids : Conjunctivae and eyelids appear normal Sclerae : White without injection HENTHead : Inspection : Normocephalic and atraumatic Face : Inspection : Face within normal limits Ears : External Ears : External ears within normal limits Nose/Nasopharynx : External Nose : External nose normal appearance, nares patent, no nasal discharge Mouth and Throat : General : Oral cavity appearance normal, breath odor normal Lips : Appearance normal NeckInspection and Palpation : Normal appearance without tenderness on palpation or deformities, trachea midline Thyroid : Normal size without tenderness, nodules or masses Jugular Veins : no JVD ChestInspection of Chest : No lesions, deformities or traumatic injuries present. No significant scars are present. Respiratory Effort : Breathing is unlabored without accessory muscle use Palpation of Chest : Chest wall non-tender with no masses present. Tactile fremitus is normal Auscultation : Normal breath sounds CardiovascularHeart : Auscultation : Heart rate is regular with normal rhythm. No murmurs are heard. No edema. GastrointestinalAbdominal Exam : Abdomen soft without rigidity or guarding, abdomen non-tender to palpation, normal bowel sounds, no masses present, non-distended Liver and Spleen : No hepatomegaly present. Liver is non-tender to palpation and spleen is not palpable. LymphaticNeck : No lymphadenopathy present Axillae : No lymphadenopathy present Groin : No lymphadenopathy present MusculoskeletalGait and Station : Normal Gait, normal station Neck/Spine/Pelvis : No tenderness of deformities present. SkinGeneral Inspection : No rashes, lesions or areas of discoloration present. Skin turgor is normal. General Palpation : No abnormalities, masses or tenderness on palpation. Neurologic and PsychiatricOrientation : Oriented to person, place and time Sensation : Normal sensation Memory : Short and long term memory intact. Mood and Affect : Normal mood with an appropriate affect     Assessment Crohn's Disease 555.2    Plan OrdersPatient not identified as an unhealthy alcohol user when screene () - - 05/23/2019 Influenza immunization administered or previously received () - - 05/23/2019 BMI screening above normal () - - 05/23/2019 Current tobacco non-user (CAD, cap, COPD, PV) (dm) (IBD) (1036F, ) - - 05/23/2019 Colorectal cancer screening results documented and reviewed (PV) (3017F) - - 05/23/2019 Documentation of current medications. () - - 05/23/2019 InstructionsI have documented a list of current medications and reviewed it with the patient. I encouraged the patient to diet and exercise. CorrespondenceCC this document (Wayne Geiger Jr., MD) - 5/23/2019  Same labs as I ordered in 2018. She has started Lexapro, at 5mg, and is being increased to 10 mg a day.   has heart disease and is diabetic.  He has 3 stents.  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           Electronically Signed by: Curry Ludwig MD -Author on May 23, 2019 03:21:42 PM

## 2024-05-03 ENCOUNTER — CLAIMS CREATED FROM THE CLAIM WINDOW (OUTPATIENT)
Dept: URBAN - METROPOLITAN AREA MEDICAL CENTER 39 | Facility: MEDICAL CENTER | Age: 64
End: 2024-05-03
Payer: MEDICARE

## 2024-05-03 DIAGNOSIS — D64.89 NORMOCYTIC ANEMIA: ICD-10-CM

## 2024-05-03 DIAGNOSIS — K50.80 CROHN'S DISEASE OF BOTH SMALL AND LARGE INTESTINE WITHOUT COMPLICATIONS: ICD-10-CM

## 2024-05-03 PROCEDURE — 99222 1ST HOSP IP/OBS MODERATE 55: CPT | Performed by: INTERNAL MEDICINE

## 2024-05-03 PROCEDURE — G8427 DOCREV CUR MEDS BY ELIG CLIN: HCPCS | Performed by: INTERNAL MEDICINE

## 2024-05-03 PROCEDURE — 99254 IP/OBS CNSLTJ NEW/EST MOD 60: CPT | Performed by: INTERNAL MEDICINE

## 2024-05-06 ENCOUNTER — CLAIMS CREATED FROM THE CLAIM WINDOW (OUTPATIENT)
Dept: URBAN - METROPOLITAN AREA MEDICAL CENTER 39 | Facility: MEDICAL CENTER | Age: 64
End: 2024-05-06
Payer: MEDICARE

## 2024-05-06 DIAGNOSIS — D64.89 NORMOCYTIC ANEMIA: ICD-10-CM

## 2024-05-06 DIAGNOSIS — K50.80 CROHN'S DISEASE OF BOTH SMALL AND LARGE INTESTINE: ICD-10-CM

## 2024-05-06 PROCEDURE — 99232 SBSQ HOSP IP/OBS MODERATE 35: CPT | Performed by: STUDENT IN AN ORGANIZED HEALTH CARE EDUCATION/TRAINING PROGRAM

## 2024-05-07 ENCOUNTER — CLAIMS CREATED FROM THE CLAIM WINDOW (OUTPATIENT)
Dept: URBAN - METROPOLITAN AREA MEDICAL CENTER 39 | Facility: MEDICAL CENTER | Age: 64
End: 2024-05-07
Payer: MEDICARE

## 2024-05-07 DIAGNOSIS — K50.80 CROHN'S DISEASE OF BOTH SMALL AND LARGE INTESTINE: ICD-10-CM

## 2024-05-07 PROCEDURE — 99232 SBSQ HOSP IP/OBS MODERATE 35: CPT | Performed by: STUDENT IN AN ORGANIZED HEALTH CARE EDUCATION/TRAINING PROGRAM
